# Patient Record
Sex: FEMALE | Race: OTHER | HISPANIC OR LATINO | URBAN - METROPOLITAN AREA
[De-identification: names, ages, dates, MRNs, and addresses within clinical notes are randomized per-mention and may not be internally consistent; named-entity substitution may affect disease eponyms.]

---

## 2019-11-14 ENCOUNTER — OUTPATIENT (OUTPATIENT)
Dept: OUTPATIENT SERVICES | Facility: HOSPITAL | Age: 29
LOS: 1 days | End: 2019-11-14
Payer: MEDICAID

## 2019-11-14 VITALS
HEIGHT: 64 IN | TEMPERATURE: 98 F | WEIGHT: 139.99 LBS | RESPIRATION RATE: 16 BRPM | DIASTOLIC BLOOD PRESSURE: 57 MMHG | HEART RATE: 79 BPM | OXYGEN SATURATION: 100 % | SYSTOLIC BLOOD PRESSURE: 97 MMHG

## 2019-11-14 DIAGNOSIS — L02.91 CUTANEOUS ABSCESS, UNSPECIFIED: ICD-10-CM

## 2019-11-14 DIAGNOSIS — L98.9 DISORDER OF THE SKIN AND SUBCUTANEOUS TISSUE, UNSPECIFIED: ICD-10-CM

## 2019-11-14 DIAGNOSIS — Z01.818 ENCOUNTER FOR OTHER PREPROCEDURAL EXAMINATION: ICD-10-CM

## 2019-11-14 DIAGNOSIS — Z98.891 HISTORY OF UTERINE SCAR FROM PREVIOUS SURGERY: Chronic | ICD-10-CM

## 2019-11-14 LAB
HCG SERPL-ACNC: <1 MIU/ML — SIGNIFICANT CHANGE UP
HCT VFR BLD CALC: 35.6 % — SIGNIFICANT CHANGE UP (ref 34.5–45)
HGB BLD-MCNC: 11.9 G/DL — SIGNIFICANT CHANGE UP (ref 11.5–15.5)
MCHC RBC-ENTMCNC: 26.7 PG — LOW (ref 27–34)
MCHC RBC-ENTMCNC: 33.4 GM/DL — SIGNIFICANT CHANGE UP (ref 32–36)
MCV RBC AUTO: 80 FL — SIGNIFICANT CHANGE UP (ref 80–100)
NRBC # BLD: 0 /100 WBCS — SIGNIFICANT CHANGE UP (ref 0–0)
PLATELET # BLD AUTO: 338 K/UL — SIGNIFICANT CHANGE UP (ref 150–400)
RBC # BLD: 4.45 M/UL — SIGNIFICANT CHANGE UP (ref 3.8–5.2)
RBC # FLD: 14.9 % — HIGH (ref 10.3–14.5)
WBC # BLD: 6.53 K/UL — SIGNIFICANT CHANGE UP (ref 3.8–10.5)
WBC # FLD AUTO: 6.53 K/UL — SIGNIFICANT CHANGE UP (ref 3.8–10.5)

## 2019-11-14 PROCEDURE — G0463: CPT

## 2019-11-14 PROCEDURE — 84702 CHORIONIC GONADOTROPIN TEST: CPT

## 2019-11-14 PROCEDURE — 85027 COMPLETE CBC AUTOMATED: CPT

## 2019-11-14 PROCEDURE — 36415 COLL VENOUS BLD VENIPUNCTURE: CPT

## 2019-11-14 PROCEDURE — 87389 HIV-1 AG W/HIV-1&-2 AB AG IA: CPT

## 2019-11-14 NOTE — H&P PST ADULT - HISTORY OF PRESENT ILLNESS
30 yo healthy Emirati speaking female, presents to [facility] scheduled for an excision of subcutaneous fluid collection on 2019 with Dr. Villarreal. S/P  section 2019. Reports lower abd pain, along the incision line. MRI shows "bag holding liquid", as per patient. Reports that the "hole never closed"  LMP 2019

## 2019-11-14 NOTE — H&P PST ADULT - NSICDXPROBLEM_GEN_ALL_CORE_FT
PROBLEM DIAGNOSES  Problem: Cutaneous abscess, unspecified  Assessment and Plan: scheduled for an excision of subcutaneous fluid collection on 11/20/2019 with Dr. Villarreal.    Problem: Disorder of the skin and subcutaneous tissue, unspecified  Assessment and Plan: scheduled for an excision of subcutaneous fluid collection on 11/20/2019 with Dr. Villarreal.    Problem: Pre-op evaluation  Assessment and Plan: Labs - CBC, HCG and HIV  No MC needed  Pre op and Hibiclens instructions reviewed and given. Take routine am meds DOS with sip of water. Avoid NSAIDs and OTC supplements. Verbalized understanding

## 2019-11-14 NOTE — H&P PST ADULT - ASSESSMENT
28 yo female scheduled for an excision of subcutaneous fluid collection on 11/20/2019 with Dr. Villarreal.

## 2019-11-14 NOTE — H&P PST ADULT - NSICDXPASTMEDICALHX_GEN_ALL_CORE_FT
PAST MEDICAL HISTORY:  Cutaneous abscess, unspecified     Disorder of the skin and subcutaneous tissue, unspecified

## 2019-11-15 LAB — HIV 1+2 AB+HIV1 P24 AG SERPL QL IA: SIGNIFICANT CHANGE UP

## 2019-11-19 ENCOUNTER — TRANSCRIPTION ENCOUNTER (OUTPATIENT)
Age: 29
End: 2019-11-19

## 2019-11-19 RX ORDER — SODIUM CHLORIDE 9 MG/ML
1000 INJECTION, SOLUTION INTRAVENOUS
Refills: 0 | Status: DISCONTINUED | OUTPATIENT
Start: 2019-11-20 | End: 2019-11-20

## 2019-11-19 RX ORDER — ONDANSETRON 8 MG/1
4 TABLET, FILM COATED ORAL ONCE
Refills: 0 | Status: DISCONTINUED | OUTPATIENT
Start: 2019-11-20 | End: 2019-11-20

## 2019-11-19 RX ORDER — HYDROMORPHONE HYDROCHLORIDE 2 MG/ML
0.5 INJECTION INTRAMUSCULAR; INTRAVENOUS; SUBCUTANEOUS
Refills: 0 | Status: DISCONTINUED | OUTPATIENT
Start: 2019-11-20 | End: 2019-11-20

## 2019-11-19 RX ORDER — OXYCODONE HYDROCHLORIDE 5 MG/1
5 TABLET ORAL ONCE
Refills: 0 | Status: DISCONTINUED | OUTPATIENT
Start: 2019-11-20 | End: 2019-11-20

## 2019-11-20 ENCOUNTER — RESULT REVIEW (OUTPATIENT)
Age: 29
End: 2019-11-20

## 2019-11-20 ENCOUNTER — OUTPATIENT (OUTPATIENT)
Dept: OUTPATIENT SERVICES | Facility: HOSPITAL | Age: 29
LOS: 1 days | End: 2019-11-20
Payer: MEDICAID

## 2019-11-20 VITALS
WEIGHT: 139.99 LBS | DIASTOLIC BLOOD PRESSURE: 47 MMHG | TEMPERATURE: 98 F | RESPIRATION RATE: 12 BRPM | HEIGHT: 64 IN | SYSTOLIC BLOOD PRESSURE: 101 MMHG | OXYGEN SATURATION: 95 % | HEART RATE: 63 BPM

## 2019-11-20 VITALS
OXYGEN SATURATION: 98 % | SYSTOLIC BLOOD PRESSURE: 113 MMHG | HEART RATE: 73 BPM | RESPIRATION RATE: 15 BRPM | DIASTOLIC BLOOD PRESSURE: 69 MMHG

## 2019-11-20 DIAGNOSIS — L02.91 CUTANEOUS ABSCESS, UNSPECIFIED: ICD-10-CM

## 2019-11-20 DIAGNOSIS — Z98.891 HISTORY OF UTERINE SCAR FROM PREVIOUS SURGERY: Chronic | ICD-10-CM

## 2019-11-20 DIAGNOSIS — L98.9 DISORDER OF THE SKIN AND SUBCUTANEOUS TISSUE, UNSPECIFIED: ICD-10-CM

## 2019-11-20 DIAGNOSIS — Z01.818 ENCOUNTER FOR OTHER PREPROCEDURAL EXAMINATION: ICD-10-CM

## 2019-11-20 PROCEDURE — 88300 SURGICAL PATH GROSS: CPT

## 2019-11-20 PROCEDURE — 88304 TISSUE EXAM BY PATHOLOGIST: CPT | Mod: 26

## 2019-11-20 PROCEDURE — 88300 SURGICAL PATH GROSS: CPT | Mod: 26,59

## 2019-11-20 PROCEDURE — 88304 TISSUE EXAM BY PATHOLOGIST: CPT

## 2019-11-20 PROCEDURE — 10121 INC&RMVL FB SUBQ TISS COMP: CPT

## 2019-11-20 RX ORDER — SODIUM CHLORIDE 9 MG/ML
1000 INJECTION, SOLUTION INTRAVENOUS
Refills: 0 | Status: DISCONTINUED | OUTPATIENT
Start: 2019-11-20 | End: 2019-11-20

## 2019-11-20 RX ORDER — CEFAZOLIN SODIUM 1 G
2000 VIAL (EA) INJECTION ONCE
Refills: 0 | Status: COMPLETED | OUTPATIENT
Start: 2019-11-20 | End: 2019-11-20

## 2019-11-20 RX ADMIN — SODIUM CHLORIDE 75 MILLILITER(S): 9 INJECTION, SOLUTION INTRAVENOUS at 13:30

## 2019-11-20 RX ADMIN — SODIUM CHLORIDE 75 MILLILITER(S): 9 INJECTION, SOLUTION INTRAVENOUS at 10:14

## 2019-11-20 RX ADMIN — HYDROMORPHONE HYDROCHLORIDE 0.5 MILLIGRAM(S): 2 INJECTION INTRAMUSCULAR; INTRAVENOUS; SUBCUTANEOUS at 13:38

## 2019-11-20 NOTE — ASU DISCHARGE PLAN (ADULT/PEDIATRIC) - CALL YOUR DOCTOR IF YOU HAVE ANY OF THE FOLLOWING:
Wound/Surgical Site with redness, or foul smelling discharge or pus/Nausea and vomiting that does not stop/Pain not relieved by Medications/Fever greater than (need to indicate Fahrenheit or Celsius)

## 2019-11-20 NOTE — BRIEF OPERATIVE NOTE - OPERATION/FINDINGS
Incision made over preexisting scar, approximately [id number] foreign body consistent with iodiform packing removed from subcutaneous tissue. Reactionary tissue surrounding foreign body removed using scalpel and bovie. Small fascial incision closed with running 0-vicryl suture. Subcutaneous layer reapproximated with 2-0 vicryl. Skin closed with 4-0 vicryl. Dermabond placed over incision.

## 2019-11-20 NOTE — ASU DISCHARGE PLAN (ADULT/PEDIATRIC) - CARE PROVIDER_API CALL
Melodie Poe)  Gynecology Obstetrics  Gynecology  [address] [place] [address]  Phone: [phone]  Fax: [phone]  Follow Up Time:

## 2020-11-12 PROBLEM — L02.91 CUTANEOUS ABSCESS, UNSPECIFIED: Chronic | Status: ACTIVE | Noted: 2019-11-14

## 2020-11-12 PROBLEM — L98.9 DISORDER OF THE SKIN AND SUBCUTANEOUS TISSUE, UNSPECIFIED: Chronic | Status: ACTIVE | Noted: 2019-11-14

## 2020-11-18 ENCOUNTER — APPOINTMENT (OUTPATIENT)
Dept: INFECTIOUS DISEASE | Facility: CLINIC | Age: 30
End: 2020-11-18
Payer: MEDICAID

## 2020-11-18 VITALS
BODY MASS INDEX: 20.57 KG/M2 | OXYGEN SATURATION: 96 % | WEIGHT: 128 LBS | DIASTOLIC BLOOD PRESSURE: 66 MMHG | SYSTOLIC BLOOD PRESSURE: 94 MMHG | HEIGHT: 66 IN | TEMPERATURE: 98.7 F | HEART RATE: 75 BPM

## 2020-11-18 PROCEDURE — 99203 OFFICE O/P NEW LOW 30 MIN: CPT | Mod: 25

## 2020-11-18 NOTE — HISTORY OF PRESENT ILLNESS
[Sexually Active] : The patient is sexually active [Monogamous] : monogamous [Vaginal] : vaginal [Men] : with men [Male ___] : [unfilled] male [FreeTextEntry1] : 31 y/o female patient presents for PEP management. Patient reports being stuck by a needle on her right pointer finger on 11/11 while throwing away garbage in a dumpster at her house. She washed the area right away and cleansed the area with alcohol. She was seen at [facility] urgent care on [facility] the next day. \par She had baseline blood work done, received 1st Hep B series vaccine and was prescribed Truvada (daily) and Isentress (BID). They prescribed her 30 days worth of medications. \par Patient states the medications are making her nausea, otherwise no other side effects are noted. \par  [de-identified] : Denies  [de-identified] : Unemployed  [de-identified] : Negative  [de-identified] :

## 2020-11-18 NOTE — ASSESSMENT
[FreeTextEntry1] : *PEP Management:\par -Continue medication regimen, instructed to complete only 28 days, discard the rest\par -Take with food to decrease nausea episodes, if worsens notify me\par -Safe sex practices discussed until confirmatory testing are completed, pt understood and agreed\par -Advised once medication is completed, return 1 week after for repeat testing\par -Repeat testing at 3 month period\par -Repeat testing to be done at 6 month period\par -Return to [facility] in 1 month for 2nd Hep B vaccine as directed\par -All questions answered\par \par *Follow up as directed  [Treatment Education] : treatment education [Rx Dose / Side Effects] : Rx dose/side effects [Risk Reduction] : risk reduction [HIV Education] : HIV Education [Sexuality / Safer Sex] : sexuality/safer sex [Anticipatory Guidance] : anticipatory guidance

## 2020-11-18 NOTE — PHYSICAL EXAM
[General Appearance - Alert] : alert [General Appearance - In No Acute Distress] : in no acute distress [Oropharynx] : the oropharynx was normal with no thrush [Neck Appearance] : the appearance of the neck was normal [] : no respiratory distress [Auscultation Breath Sounds / Voice Sounds] : lungs were clear to auscultation bilaterally [Heart Rate And Rhythm] : heart rate was normal and rhythm regular [Heart Sounds] : normal S1 and S2 [Heart Sounds Gallop] : no gallops [Murmurs] : no murmurs [Heart Sounds Pericardial Friction Rub] : no pericardial rub [No Palpable Adenopathy] : no palpable adenopathy [Skin Color & Pigmentation] : normal skin color and pigmentation [No Focal Deficits] : no focal deficits [Oriented To Time, Place, And Person] : oriented to person, place, and time [Affect] : the affect was normal

## 2020-12-16 ENCOUNTER — APPOINTMENT (OUTPATIENT)
Dept: INFECTIOUS DISEASE | Facility: CLINIC | Age: 30
End: 2020-12-16
Payer: MEDICAID

## 2020-12-16 VITALS
SYSTOLIC BLOOD PRESSURE: 94 MMHG | OXYGEN SATURATION: 97 % | WEIGHT: 129 LBS | DIASTOLIC BLOOD PRESSURE: 60 MMHG | RESPIRATION RATE: 16 BRPM | HEIGHT: 66 IN | BODY MASS INDEX: 20.73 KG/M2 | HEART RATE: 90 BPM | TEMPERATURE: 97.9 F
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
## 2020-12-16 DIAGNOSIS — Z29.9 ENCOUNTER FOR PROPHYLACTIC MEASURES, UNSPECIFIED: ICD-10-CM

## 2020-12-16 PROCEDURE — 99072 ADDL SUPL MATRL&STAF TM PHE: CPT

## 2020-12-16 PROCEDURE — 99213 OFFICE O/P EST LOW 20 MIN: CPT

## 2020-12-16 NOTE — HISTORY OF PRESENT ILLNESS
[Sexually Active] : The patient is sexually active [Monogamous] : monogamous [Condom Use] : using condoms [FreeTextEntry1] : 31 y/o female patient presents for PEP follow up appointment. She has completed the Truvada/Isentress regimen as directed 1 week ago and is now returning for repeat testing. She denies any side effects from the medications. \par Today she denies any complaints or concerns, she is feeling well.  \par LMP 12/3

## 2020-12-16 NOTE — ASSESSMENT
[FreeTextEntry1] : *PEP Management:\par -Labs today: HIV, Syphilis, CMP, CBC\par -continue safe sex practices until confirmatory results are available\par -Pt has appointment at [facility] for 2nd Hep B vaccine 12/18\par -All questions answered\par \par *Return at the 3 month period for repeat testing\par  [Risk Reduction] : risk reduction [HIV Education] : HIV Education [Anticipatory Guidance] : anticipatory guidance

## 2020-12-17 LAB
ALBUMIN SERPL ELPH-MCNC: 4.5 G/DL
ALP BLD-CCNC: 69 U/L
ALT SERPL-CCNC: 11 U/L
ANION GAP SERPL CALC-SCNC: 10 MMOL/L
AST SERPL-CCNC: 17 U/L
BASOPHILS # BLD AUTO: 0.08 K/UL
BASOPHILS NFR BLD AUTO: 1.2 %
BILIRUB SERPL-MCNC: 0.2 MG/DL
BUN SERPL-MCNC: 9 MG/DL
CALCIUM SERPL-MCNC: 9.4 MG/DL
CHLORIDE SERPL-SCNC: 103 MMOL/L
CO2 SERPL-SCNC: 28 MMOL/L
CREAT SERPL-MCNC: 0.99 MG/DL
EOSINOPHIL # BLD AUTO: 0.25 K/UL
EOSINOPHIL NFR BLD AUTO: 3.7 %
GLUCOSE SERPL-MCNC: 85 MG/DL
HCT VFR BLD CALC: 38.5 %
HGB BLD-MCNC: 12.2 G/DL
HIV1+2 AB SPEC QL IA.RAPID: NONREACTIVE
IMM GRANULOCYTES NFR BLD AUTO: 0.1 %
LYMPHOCYTES # BLD AUTO: 2.91 K/UL
LYMPHOCYTES NFR BLD AUTO: 43.2 %
MAN DIFF?: NORMAL
MCHC RBC-ENTMCNC: 27.7 PG
MCHC RBC-ENTMCNC: 31.7 GM/DL
MCV RBC AUTO: 87.3 FL
MONOCYTES # BLD AUTO: 0.46 K/UL
MONOCYTES NFR BLD AUTO: 6.8 %
NEUTROPHILS # BLD AUTO: 3.03 K/UL
NEUTROPHILS NFR BLD AUTO: 45 %
PLATELET # BLD AUTO: 267 K/UL
POTASSIUM SERPL-SCNC: 4 MMOL/L
PROT SERPL-MCNC: 7.4 G/DL
RBC # BLD: 4.41 M/UL
RBC # FLD: 12.6 %
SODIUM SERPL-SCNC: 141 MMOL/L
T PALLIDUM AB SER QL IA: NEGATIVE
WBC # FLD AUTO: 6.74 K/UL

## 2021-03-23 ENCOUNTER — APPOINTMENT (OUTPATIENT)
Dept: INFECTIOUS DISEASE | Facility: CLINIC | Age: 31
End: 2021-03-23

## 2021-11-05 ENCOUNTER — ASOB RESULT (OUTPATIENT)
Age: 31
End: 2021-11-05

## 2021-11-05 ENCOUNTER — APPOINTMENT (OUTPATIENT)
Dept: ANTEPARTUM | Facility: CLINIC | Age: 31
End: 2021-11-05
Payer: MEDICAID

## 2021-11-05 PROCEDURE — 76813 OB US NUCHAL MEAS 1 GEST: CPT

## 2021-11-05 PROCEDURE — 76801 OB US < 14 WKS SINGLE FETUS: CPT

## 2021-12-30 ENCOUNTER — APPOINTMENT (OUTPATIENT)
Dept: ANTEPARTUM | Facility: CLINIC | Age: 31
End: 2021-12-30
Payer: MEDICAID

## 2021-12-30 ENCOUNTER — ASOB RESULT (OUTPATIENT)
Age: 31
End: 2021-12-30

## 2021-12-30 PROCEDURE — 76817 TRANSVAGINAL US OBSTETRIC: CPT

## 2021-12-30 PROCEDURE — 76811 OB US DETAILED SNGL FETUS: CPT

## 2022-03-21 ENCOUNTER — ASOB RESULT (OUTPATIENT)
Age: 32
End: 2022-03-21

## 2022-03-21 ENCOUNTER — APPOINTMENT (OUTPATIENT)
Dept: ANTEPARTUM | Facility: CLINIC | Age: 32
End: 2022-03-21
Payer: MEDICAID

## 2022-03-21 ENCOUNTER — APPOINTMENT (OUTPATIENT)
Dept: ANTEPARTUM | Facility: CLINIC | Age: 32
End: 2022-03-21

## 2022-03-21 PROCEDURE — 76818 FETAL BIOPHYS PROFILE W/NST: CPT

## 2022-03-21 PROCEDURE — 99202 OFFICE O/P NEW SF 15 MIN: CPT | Mod: TH,25

## 2022-03-21 PROCEDURE — 76820 UMBILICAL ARTERY ECHO: CPT

## 2022-03-21 PROCEDURE — 76816 OB US FOLLOW-UP PER FETUS: CPT

## 2022-03-28 ENCOUNTER — ASOB RESULT (OUTPATIENT)
Age: 32
End: 2022-03-28

## 2022-03-28 ENCOUNTER — APPOINTMENT (OUTPATIENT)
Dept: ANTEPARTUM | Facility: CLINIC | Age: 32
End: 2022-03-28
Payer: MEDICAID

## 2022-03-28 PROCEDURE — 76818 FETAL BIOPHYS PROFILE W/NST: CPT

## 2022-04-04 ENCOUNTER — ASOB RESULT (OUTPATIENT)
Age: 32
End: 2022-04-04

## 2022-04-04 ENCOUNTER — APPOINTMENT (OUTPATIENT)
Dept: ANTEPARTUM | Facility: CLINIC | Age: 32
End: 2022-04-04

## 2022-04-04 ENCOUNTER — APPOINTMENT (OUTPATIENT)
Dept: ANTEPARTUM | Facility: CLINIC | Age: 32
End: 2022-04-04
Payer: MEDICAID

## 2022-04-04 PROCEDURE — 76820 UMBILICAL ARTERY ECHO: CPT

## 2022-04-04 PROCEDURE — 76819 FETAL BIOPHYS PROFIL W/O NST: CPT

## 2022-04-11 ENCOUNTER — ASOB RESULT (OUTPATIENT)
Age: 32
End: 2022-04-11

## 2022-04-11 ENCOUNTER — APPOINTMENT (OUTPATIENT)
Dept: ANTEPARTUM | Facility: CLINIC | Age: 32
End: 2022-04-11
Payer: MEDICAID

## 2022-04-11 PROCEDURE — 76816 OB US FOLLOW-UP PER FETUS: CPT

## 2022-04-11 PROCEDURE — 76818 FETAL BIOPHYS PROFILE W/NST: CPT

## 2022-04-11 PROCEDURE — 76820 UMBILICAL ARTERY ECHO: CPT

## 2022-04-18 ENCOUNTER — ASOB RESULT (OUTPATIENT)
Age: 32
End: 2022-04-18

## 2022-04-18 ENCOUNTER — APPOINTMENT (OUTPATIENT)
Dept: ANTEPARTUM | Facility: CLINIC | Age: 32
End: 2022-04-18
Payer: MEDICAID

## 2022-04-18 PROCEDURE — 76820 UMBILICAL ARTERY ECHO: CPT

## 2022-04-18 PROCEDURE — 76818 FETAL BIOPHYS PROFILE W/NST: CPT

## 2022-04-25 ENCOUNTER — OUTPATIENT (OUTPATIENT)
Dept: OUTPATIENT SERVICES | Facility: HOSPITAL | Age: 32
LOS: 1 days | End: 2022-04-25

## 2022-04-25 ENCOUNTER — APPOINTMENT (OUTPATIENT)
Dept: ANTEPARTUM | Facility: CLINIC | Age: 32
End: 2022-04-25
Payer: MEDICAID

## 2022-04-25 ENCOUNTER — APPOINTMENT (OUTPATIENT)
Dept: ANTEPARTUM | Facility: CLINIC | Age: 32
End: 2022-04-25

## 2022-04-25 ENCOUNTER — ASOB RESULT (OUTPATIENT)
Age: 32
End: 2022-04-25

## 2022-04-25 VITALS
HEIGHT: 64 IN | DIASTOLIC BLOOD PRESSURE: 68 MMHG | RESPIRATION RATE: 16 BRPM | HEART RATE: 92 BPM | TEMPERATURE: 98 F | WEIGHT: 149.91 LBS | OXYGEN SATURATION: 99 % | SYSTOLIC BLOOD PRESSURE: 101 MMHG

## 2022-04-25 DIAGNOSIS — Z98.891 HISTORY OF UTERINE SCAR FROM PREVIOUS SURGERY: Chronic | ICD-10-CM

## 2022-04-25 DIAGNOSIS — Z98.891 HISTORY OF UTERINE SCAR FROM PREVIOUS SURGERY: ICD-10-CM

## 2022-04-25 LAB
APPEARANCE UR: ABNORMAL
BILIRUB UR-MCNC: NEGATIVE — SIGNIFICANT CHANGE UP
BLD GP AB SCN SERPL QL: NEGATIVE — SIGNIFICANT CHANGE UP
COLOR SPEC: SIGNIFICANT CHANGE UP
DIFF PNL FLD: NEGATIVE — SIGNIFICANT CHANGE UP
GLUCOSE UR QL: NEGATIVE — SIGNIFICANT CHANGE UP
HCT VFR BLD CALC: 33.2 % — LOW (ref 34.5–45)
HGB BLD-MCNC: 11 G/DL — LOW (ref 11.5–15.5)
KETONES UR-MCNC: NEGATIVE — SIGNIFICANT CHANGE UP
LEUKOCYTE ESTERASE UR-ACNC: NEGATIVE — SIGNIFICANT CHANGE UP
MCHC RBC-ENTMCNC: 28.2 PG — SIGNIFICANT CHANGE UP (ref 27–34)
MCHC RBC-ENTMCNC: 33.1 GM/DL — SIGNIFICANT CHANGE UP (ref 32–36)
MCV RBC AUTO: 85.1 FL — SIGNIFICANT CHANGE UP (ref 80–100)
NITRITE UR-MCNC: NEGATIVE — SIGNIFICANT CHANGE UP
NRBC # BLD: 0 /100 WBCS — SIGNIFICANT CHANGE UP
NRBC # FLD: 0 K/UL — SIGNIFICANT CHANGE UP
PH UR: 6.5 — SIGNIFICANT CHANGE UP (ref 5–8)
PLATELET # BLD AUTO: 239 K/UL — SIGNIFICANT CHANGE UP (ref 150–400)
PROT UR-MCNC: NEGATIVE — SIGNIFICANT CHANGE UP
RBC # BLD: 3.9 M/UL — SIGNIFICANT CHANGE UP (ref 3.8–5.2)
RBC # FLD: 14.3 % — SIGNIFICANT CHANGE UP (ref 10.3–14.5)
RH IG SCN BLD-IMP: POSITIVE — SIGNIFICANT CHANGE UP
SP GR SPEC: 1.01 — SIGNIFICANT CHANGE UP (ref 1–1.05)
UROBILINOGEN FLD QL: SIGNIFICANT CHANGE UP
WBC # BLD: 8.94 K/UL — SIGNIFICANT CHANGE UP (ref 3.8–10.5)
WBC # FLD AUTO: 8.94 K/UL — SIGNIFICANT CHANGE UP (ref 3.8–10.5)

## 2022-04-25 PROCEDURE — 76820 UMBILICAL ARTERY ECHO: CPT

## 2022-04-25 PROCEDURE — 76818 FETAL BIOPHYS PROFILE W/NST: CPT

## 2022-04-25 RX ORDER — NORETHINDRONE AND ETHINYL ESTRADIOL 0.4-0.035
1 KIT ORAL
Qty: 0 | Refills: 0 | DISCHARGE

## 2022-04-25 RX ORDER — ERGOCALCIFEROL 1.25 MG/1
0 CAPSULE ORAL
Qty: 0 | Refills: 0 | DISCHARGE

## 2022-04-25 NOTE — OB PST NOTE - PROBLEM SELECTOR PLAN 1
Repeat  section.        CBC UA T&S     Preop instructions and antibacterial soap given and explained (verbal and written), with teach back.

## 2022-04-25 NOTE — OB PST NOTE - NSICDXPASTMEDICALHX_GEN_ALL_CORE_FT
PAST MEDICAL HISTORY:  Cutaneous abscess, unspecified     Disorder of the skin and subcutaneous tissue, unspecified     History of anemia     Sickle cell trait

## 2022-04-25 NOTE — OB PST NOTE - SOURCE OF INFORMATION, OB PROFILE
patient language line Mongolian   assisted with communication at PST.  [place], REf #[id number]/patient

## 2022-04-25 NOTE — OB PST NOTE - ASSESSMENT
32 y/o female  with hx of  section due to "fever in labor".  Now with EDC 5/15/22, scheduled  for repeat  section.  Pt report she feels baby moving as usual today

## 2022-04-25 NOTE — OB PST NOTE - FALL HARM RISK - UNIVERSAL INTERVENTIONS
Bed in lowest position, wheels locked, appropriate side rails in place/Call bell, personal items and telephone in reach/Instruct patient to call for assistance before getting out of bed or chair/Non-slip footwear when patient is out of bed/[place] to call system/Physically safe environment - no spills, clutter or unnecessary equipment/Purposeful Proactive Rounding/Room/bathroom lighting operational, light cord in reach

## 2022-04-25 NOTE — OB PST NOTE - HISTORY OF PRESENT ILLNESS
32y/o female  with hx of  section due to "fever in labor".  Now with EDC 5/15/22, scheduled  for repeat  section.  Pt report she feels baby moving as usual today 30 y/o female  with hx of  section due to "fever in labor".  Now with EDC 5/15/22, scheduled  for repeat  section.  Pt report she feels baby moving as usual today

## 2022-05-02 ENCOUNTER — APPOINTMENT (OUTPATIENT)
Dept: ANTEPARTUM | Facility: CLINIC | Age: 32
End: 2022-05-02
Payer: MEDICAID

## 2022-05-02 ENCOUNTER — ASOB RESULT (OUTPATIENT)
Age: 32
End: 2022-05-02

## 2022-05-02 PROCEDURE — 76816 OB US FOLLOW-UP PER FETUS: CPT

## 2022-05-02 PROCEDURE — 76819 FETAL BIOPHYS PROFIL W/O NST: CPT

## 2022-05-08 ENCOUNTER — TRANSCRIPTION ENCOUNTER (OUTPATIENT)
Age: 32
End: 2022-05-08

## 2022-05-09 ENCOUNTER — TRANSCRIPTION ENCOUNTER (OUTPATIENT)
Age: 32
End: 2022-05-09

## 2022-05-09 ENCOUNTER — RESULT REVIEW (OUTPATIENT)
Age: 32
End: 2022-05-09

## 2022-05-09 ENCOUNTER — INPATIENT (INPATIENT)
Facility: HOSPITAL | Age: 32
LOS: 2 days | Discharge: HOME | End: 2022-05-12
Attending: OBSTETRICS & GYNECOLOGY | Admitting: OBSTETRICS & GYNECOLOGY
Payer: MEDICAID

## 2022-05-09 VITALS
HEIGHT: 66 IN | HEART RATE: 74 BPM | DIASTOLIC BLOOD PRESSURE: 56 MMHG | RESPIRATION RATE: 16 BRPM | WEIGHT: 149.91 LBS | TEMPERATURE: 98 F | SYSTOLIC BLOOD PRESSURE: 96 MMHG

## 2022-05-09 DIAGNOSIS — Z98.891 HISTORY OF UTERINE SCAR FROM PREVIOUS SURGERY: ICD-10-CM

## 2022-05-09 DIAGNOSIS — Z98.891 HISTORY OF UTERINE SCAR FROM PREVIOUS SURGERY: Chronic | ICD-10-CM

## 2022-05-09 LAB
BASOPHILS # BLD AUTO: 0.02 K/UL — SIGNIFICANT CHANGE UP (ref 0–0.2)
BASOPHILS # BLD AUTO: 0.03 K/UL — SIGNIFICANT CHANGE UP (ref 0–0.2)
BASOPHILS NFR BLD AUTO: 0.2 % — SIGNIFICANT CHANGE UP (ref 0–2)
BASOPHILS NFR BLD AUTO: 0.2 % — SIGNIFICANT CHANGE UP (ref 0–2)
COVID-19 SPIKE DOMAIN AB INTERP: POSITIVE
COVID-19 SPIKE DOMAIN ANTIBODY RESULT: >250 U/ML — HIGH
EOSINOPHIL # BLD AUTO: 0.01 K/UL — SIGNIFICANT CHANGE UP (ref 0–0.5)
EOSINOPHIL # BLD AUTO: 0.07 K/UL — SIGNIFICANT CHANGE UP (ref 0–0.5)
EOSINOPHIL NFR BLD AUTO: 0.1 % — SIGNIFICANT CHANGE UP (ref 0–6)
EOSINOPHIL NFR BLD AUTO: 0.8 % — SIGNIFICANT CHANGE UP (ref 0–6)
HCT VFR BLD CALC: 35.2 % — SIGNIFICANT CHANGE UP (ref 34.5–45)
HCT VFR BLD CALC: 35.9 % — SIGNIFICANT CHANGE UP (ref 34.5–45)
HGB BLD-MCNC: 11.8 G/DL — SIGNIFICANT CHANGE UP (ref 11.5–15.5)
HGB BLD-MCNC: 11.9 G/DL — SIGNIFICANT CHANGE UP (ref 11.5–15.5)
IANC: 13.54 K/UL — HIGH (ref 1.8–7.4)
IANC: 5.92 K/UL — SIGNIFICANT CHANGE UP (ref 1.8–7.4)
IMM GRANULOCYTES NFR BLD AUTO: 0.6 % — SIGNIFICANT CHANGE UP (ref 0–1.5)
IMM GRANULOCYTES NFR BLD AUTO: 1.5 % — SIGNIFICANT CHANGE UP (ref 0–1.5)
LYMPHOCYTES # BLD AUTO: 1.65 K/UL — SIGNIFICANT CHANGE UP (ref 1–3.3)
LYMPHOCYTES # BLD AUTO: 10.4 % — LOW (ref 13–44)
LYMPHOCYTES # BLD AUTO: 2.28 K/UL — SIGNIFICANT CHANGE UP (ref 1–3.3)
LYMPHOCYTES # BLD AUTO: 24.7 % — SIGNIFICANT CHANGE UP (ref 13–44)
MCHC RBC-ENTMCNC: 28.4 PG — SIGNIFICANT CHANGE UP (ref 27–34)
MCHC RBC-ENTMCNC: 28.4 PG — SIGNIFICANT CHANGE UP (ref 27–34)
MCHC RBC-ENTMCNC: 33.1 GM/DL — SIGNIFICANT CHANGE UP (ref 32–36)
MCHC RBC-ENTMCNC: 33.5 GM/DL — SIGNIFICANT CHANGE UP (ref 32–36)
MCV RBC AUTO: 84.8 FL — SIGNIFICANT CHANGE UP (ref 80–100)
MCV RBC AUTO: 85.7 FL — SIGNIFICANT CHANGE UP (ref 80–100)
MONOCYTES # BLD AUTO: 0.58 K/UL — SIGNIFICANT CHANGE UP (ref 0–0.9)
MONOCYTES # BLD AUTO: 0.8 K/UL — SIGNIFICANT CHANGE UP (ref 0–0.9)
MONOCYTES NFR BLD AUTO: 3.6 % — SIGNIFICANT CHANGE UP (ref 2–14)
MONOCYTES NFR BLD AUTO: 8.7 % — SIGNIFICANT CHANGE UP (ref 2–14)
NEUTROPHILS # BLD AUTO: 13.54 K/UL — HIGH (ref 1.8–7.4)
NEUTROPHILS # BLD AUTO: 5.92 K/UL — SIGNIFICANT CHANGE UP (ref 1.8–7.4)
NEUTROPHILS NFR BLD AUTO: 64.1 % — SIGNIFICANT CHANGE UP (ref 43–77)
NEUTROPHILS NFR BLD AUTO: 85.1 % — HIGH (ref 43–77)
NRBC # BLD: 0 /100 WBCS — SIGNIFICANT CHANGE UP
NRBC # BLD: 0 /100 WBCS — SIGNIFICANT CHANGE UP
NRBC # FLD: 0 K/UL — SIGNIFICANT CHANGE UP
NRBC # FLD: 0 K/UL — SIGNIFICANT CHANGE UP
PLATELET # BLD AUTO: 181 K/UL — SIGNIFICANT CHANGE UP (ref 150–400)
PLATELET # BLD AUTO: 196 K/UL — SIGNIFICANT CHANGE UP (ref 150–400)
RBC # BLD: 4.15 M/UL — SIGNIFICANT CHANGE UP (ref 3.8–5.2)
RBC # BLD: 4.19 M/UL — SIGNIFICANT CHANGE UP (ref 3.8–5.2)
RBC # FLD: 13.7 % — SIGNIFICANT CHANGE UP (ref 10.3–14.5)
RBC # FLD: 13.9 % — SIGNIFICANT CHANGE UP (ref 10.3–14.5)
SARS-COV-2 IGG+IGM SERPL QL IA: >250 U/ML — HIGH
SARS-COV-2 IGG+IGM SERPL QL IA: POSITIVE
T PALLIDUM AB TITR SER: NEGATIVE — SIGNIFICANT CHANGE UP
WBC # BLD: 15.91 K/UL — HIGH (ref 3.8–10.5)
WBC # BLD: 9.23 K/UL — SIGNIFICANT CHANGE UP (ref 3.8–10.5)
WBC # FLD AUTO: 15.91 K/UL — HIGH (ref 3.8–10.5)
WBC # FLD AUTO: 9.23 K/UL — SIGNIFICANT CHANGE UP (ref 3.8–10.5)

## 2022-05-09 PROCEDURE — 88302 TISSUE EXAM BY PATHOLOGIST: CPT | Mod: 26

## 2022-05-09 DEVICE — SURGICEL FIBRILLAR 1 X 2": Type: IMPLANTABLE DEVICE | Status: FUNCTIONAL

## 2022-05-09 RX ORDER — HEPARIN SODIUM 5000 [USP'U]/ML
5000 INJECTION INTRAVENOUS; SUBCUTANEOUS EVERY 12 HOURS
Refills: 0 | Status: DISCONTINUED | OUTPATIENT
Start: 2022-05-09 | End: 2022-05-09

## 2022-05-09 RX ORDER — OXYCODONE HYDROCHLORIDE 5 MG/1
5 TABLET ORAL ONCE
Refills: 0 | Status: DISCONTINUED | OUTPATIENT
Start: 2022-05-09 | End: 2022-05-09

## 2022-05-09 RX ORDER — OXYCODONE HYDROCHLORIDE 5 MG/1
5 TABLET ORAL
Refills: 0 | Status: COMPLETED | OUTPATIENT
Start: 2022-05-09 | End: 2022-05-16

## 2022-05-09 RX ORDER — IBUPROFEN 200 MG
1 TABLET ORAL
Qty: 0 | Refills: 0 | DISCHARGE
Start: 2022-05-09

## 2022-05-09 RX ORDER — FAMOTIDINE 10 MG/ML
20 INJECTION INTRAVENOUS ONCE
Refills: 0 | Status: COMPLETED | OUTPATIENT
Start: 2022-05-09 | End: 2022-05-09

## 2022-05-09 RX ORDER — MAGNESIUM HYDROXIDE 400 MG/1
30 TABLET, CHEWABLE ORAL
Refills: 0 | Status: DISCONTINUED | OUTPATIENT
Start: 2022-05-09 | End: 2022-05-12

## 2022-05-09 RX ORDER — MAGNESIUM HYDROXIDE 400 MG/1
30 TABLET, CHEWABLE ORAL
Refills: 0 | Status: DISCONTINUED | OUTPATIENT
Start: 2022-05-09 | End: 2022-05-09

## 2022-05-09 RX ORDER — OXYTOCIN 10 UNIT/ML
333.33 VIAL (ML) INJECTION
Qty: 20 | Refills: 0 | Status: DISCONTINUED | OUTPATIENT
Start: 2022-05-09 | End: 2022-05-09

## 2022-05-09 RX ORDER — DIPHENHYDRAMINE HCL 50 MG
25 CAPSULE ORAL EVERY 6 HOURS
Refills: 0 | Status: DISCONTINUED | OUTPATIENT
Start: 2022-05-09 | End: 2022-05-12

## 2022-05-09 RX ORDER — OXYCODONE HYDROCHLORIDE 5 MG/1
5 TABLET ORAL
Refills: 0 | Status: DISCONTINUED | OUTPATIENT
Start: 2022-05-09 | End: 2022-05-09

## 2022-05-09 RX ORDER — SODIUM CHLORIDE 9 MG/ML
1000 INJECTION, SOLUTION INTRAVENOUS
Refills: 0 | Status: DISCONTINUED | OUTPATIENT
Start: 2022-05-09 | End: 2022-05-09

## 2022-05-09 RX ORDER — ACETAMINOPHEN 500 MG
3 TABLET ORAL
Qty: 0 | Refills: 0 | DISCHARGE
Start: 2022-05-09

## 2022-05-09 RX ORDER — ONDANSETRON 8 MG/1
4 TABLET, FILM COATED ORAL EVERY 6 HOURS
Refills: 0 | Status: DISCONTINUED | OUTPATIENT
Start: 2022-05-09 | End: 2022-05-12

## 2022-05-09 RX ORDER — TETANUS TOXOID, REDUCED DIPHTHERIA TOXOID AND ACELLULAR PERTUSSIS VACCINE, ADSORBED 5; 2.5; 8; 8; 2.5 [IU]/.5ML; [IU]/.5ML; UG/.5ML; UG/.5ML; UG/.5ML
0.5 SUSPENSION INTRAMUSCULAR ONCE
Refills: 0 | Status: DISCONTINUED | OUTPATIENT
Start: 2022-05-09 | End: 2022-05-09

## 2022-05-09 RX ORDER — ACETAMINOPHEN 500 MG
1000 TABLET ORAL ONCE
Refills: 0 | Status: DISCONTINUED | OUTPATIENT
Start: 2022-05-09 | End: 2022-05-12

## 2022-05-09 RX ORDER — NALOXONE HYDROCHLORIDE 4 MG/.1ML
0.1 SPRAY NASAL
Refills: 0 | Status: DISCONTINUED | OUTPATIENT
Start: 2022-05-09 | End: 2022-05-12

## 2022-05-09 RX ORDER — SIMETHICONE 80 MG/1
80 TABLET, CHEWABLE ORAL EVERY 4 HOURS
Refills: 0 | Status: DISCONTINUED | OUTPATIENT
Start: 2022-05-09 | End: 2022-05-09

## 2022-05-09 RX ORDER — SODIUM CHLORIDE 9 MG/ML
1000 INJECTION, SOLUTION INTRAVENOUS ONCE
Refills: 0 | Status: COMPLETED | OUTPATIENT
Start: 2022-05-09 | End: 2022-05-09

## 2022-05-09 RX ORDER — DIPHENHYDRAMINE HCL 50 MG
25 CAPSULE ORAL EVERY 6 HOURS
Refills: 0 | Status: DISCONTINUED | OUTPATIENT
Start: 2022-05-09 | End: 2022-05-09

## 2022-05-09 RX ORDER — LANOLIN
1 OINTMENT (GRAM) TOPICAL EVERY 6 HOURS
Refills: 0 | Status: DISCONTINUED | OUTPATIENT
Start: 2022-05-09 | End: 2022-05-12

## 2022-05-09 RX ORDER — KETOROLAC TROMETHAMINE 30 MG/ML
30 SYRINGE (ML) INJECTION EVERY 6 HOURS
Refills: 0 | Status: DISCONTINUED | OUTPATIENT
Start: 2022-05-09 | End: 2022-05-10

## 2022-05-09 RX ORDER — OXYTOCIN 10 UNIT/ML
333.33 VIAL (ML) INJECTION
Qty: 20 | Refills: 0 | Status: DISCONTINUED | OUTPATIENT
Start: 2022-05-09 | End: 2022-05-10

## 2022-05-09 RX ORDER — DEXAMETHASONE 0.5 MG/5ML
4 ELIXIR ORAL EVERY 6 HOURS
Refills: 0 | Status: DISCONTINUED | OUTPATIENT
Start: 2022-05-09 | End: 2022-05-12

## 2022-05-09 RX ORDER — LANOLIN
1 OINTMENT (GRAM) TOPICAL EVERY 6 HOURS
Refills: 0 | Status: DISCONTINUED | OUTPATIENT
Start: 2022-05-09 | End: 2022-05-09

## 2022-05-09 RX ORDER — IBUPROFEN 200 MG
600 TABLET ORAL EVERY 6 HOURS
Refills: 0 | Status: DISCONTINUED | OUTPATIENT
Start: 2022-05-09 | End: 2022-05-09

## 2022-05-09 RX ORDER — TETANUS TOXOID, REDUCED DIPHTHERIA TOXOID AND ACELLULAR PERTUSSIS VACCINE, ADSORBED 5; 2.5; 8; 8; 2.5 [IU]/.5ML; [IU]/.5ML; UG/.5ML; UG/.5ML; UG/.5ML
0.5 SUSPENSION INTRAMUSCULAR ONCE
Refills: 0 | Status: COMPLETED | OUTPATIENT
Start: 2022-05-09 | End: 2022-05-10

## 2022-05-09 RX ORDER — FOLIC ACID 0.8 MG
1 TABLET ORAL DAILY
Refills: 0 | Status: DISCONTINUED | OUTPATIENT
Start: 2022-05-09 | End: 2022-05-12

## 2022-05-09 RX ORDER — ACETAMINOPHEN 500 MG
975 TABLET ORAL
Refills: 0 | Status: DISCONTINUED | OUTPATIENT
Start: 2022-05-09 | End: 2022-05-09

## 2022-05-09 RX ORDER — ACETAMINOPHEN 500 MG
975 TABLET ORAL
Refills: 0 | Status: DISCONTINUED | OUTPATIENT
Start: 2022-05-09 | End: 2022-05-12

## 2022-05-09 RX ORDER — FERROUS SULFATE 325(65) MG
325 TABLET ORAL DAILY
Refills: 0 | Status: DISCONTINUED | OUTPATIENT
Start: 2022-05-09 | End: 2022-05-12

## 2022-05-09 RX ORDER — KETOROLAC TROMETHAMINE 30 MG/ML
30 SYRINGE (ML) INJECTION EVERY 6 HOURS
Refills: 0 | Status: DISCONTINUED | OUTPATIENT
Start: 2022-05-09 | End: 2022-05-09

## 2022-05-09 RX ORDER — ASPIRIN/CALCIUM CARB/MAGNESIUM 324 MG
0 TABLET ORAL
Qty: 0 | Refills: 0 | DISCHARGE

## 2022-05-09 RX ORDER — OXYCODONE HYDROCHLORIDE 5 MG/1
5 TABLET ORAL ONCE
Refills: 0 | Status: DISCONTINUED | OUTPATIENT
Start: 2022-05-09 | End: 2022-05-12

## 2022-05-09 RX ORDER — SIMETHICONE 80 MG/1
80 TABLET, CHEWABLE ORAL EVERY 4 HOURS
Refills: 0 | Status: DISCONTINUED | OUTPATIENT
Start: 2022-05-09 | End: 2022-05-12

## 2022-05-09 RX ORDER — IBUPROFEN 200 MG
600 TABLET ORAL EVERY 6 HOURS
Refills: 0 | Status: COMPLETED | OUTPATIENT
Start: 2022-05-09 | End: 2023-04-07

## 2022-05-09 RX ORDER — CITRIC ACID/SODIUM CITRATE 300-500 MG
30 SOLUTION, ORAL ORAL ONCE
Refills: 0 | Status: COMPLETED | OUTPATIENT
Start: 2022-05-09 | End: 2022-05-09

## 2022-05-09 RX ORDER — TETANUS TOXOID, REDUCED DIPHTHERIA TOXOID AND ACELLULAR PERTUSSIS VACCINE, ADSORBED 5; 2.5; 8; 8; 2.5 [IU]/.5ML; [IU]/.5ML; UG/.5ML; UG/.5ML; UG/.5ML
0.5 SUSPENSION INTRAMUSCULAR ONCE
Refills: 0 | Status: COMPLETED | OUTPATIENT
Start: 2022-05-09

## 2022-05-09 RX ORDER — HEPARIN SODIUM 5000 [USP'U]/ML
5000 INJECTION INTRAVENOUS; SUBCUTANEOUS EVERY 12 HOURS
Refills: 0 | Status: DISCONTINUED | OUTPATIENT
Start: 2022-05-09 | End: 2022-05-12

## 2022-05-09 RX ADMIN — HEPARIN SODIUM 5000 UNIT(S): 5000 INJECTION INTRAVENOUS; SUBCUTANEOUS at 18:32

## 2022-05-09 RX ADMIN — SODIUM CHLORIDE 2000 MILLILITER(S): 9 INJECTION, SOLUTION INTRAVENOUS at 08:00

## 2022-05-09 RX ADMIN — FAMOTIDINE 20 MILLIGRAM(S): 10 INJECTION INTRAVENOUS at 09:38

## 2022-05-09 RX ADMIN — Medication 325 MILLIGRAM(S): at 18:30

## 2022-05-09 RX ADMIN — Medication 1 TABLET(S): at 18:29

## 2022-05-09 RX ADMIN — Medication 975 MILLIGRAM(S): at 22:11

## 2022-05-09 RX ADMIN — Medication 30 MILLILITER(S): at 09:39

## 2022-05-09 RX ADMIN — Medication 30 MILLIGRAM(S): at 18:50

## 2022-05-09 RX ADMIN — Medication 975 MILLIGRAM(S): at 21:11

## 2022-05-09 RX ADMIN — Medication 30 MILLIGRAM(S): at 18:35

## 2022-05-09 RX ADMIN — Medication 1 MILLIGRAM(S): at 18:30

## 2022-05-09 RX ADMIN — Medication 30 MILLIGRAM(S): at 23:57

## 2022-05-09 NOTE — DISCHARGE NOTE OB - NS MD DC FALL RISK RISK
For information on Fall & Injury Prevention, visit: [email or web address] OR  [email or web address] OR  https://www.cdc.gov/steadi/patient.html

## 2022-05-09 NOTE — OB RN INTRAOPERATIVE NOTE - NSSELHIDDEN_OBGYN_ALL_OB_FT
[NS_DeliveryAttending2_OBGYN_ALL_OB_FT:[id number]],[NS_DeliveryAssist1_OBGYN_ALL_OB_FT:[id number]],[NS_DeliveryRN_OBGYN_ALL_OB_FT:[id number]] [NS_DeliveryAttending2_OBGYN_ALL_OB_FT:[id number]=],[NS_DeliveryAssist1_OBGYN_ALL_OB_FT:[id number]],[NS_DeliveryRN_OBGYN_ALL_OB_FT:[id number]],[NS_DeliveryAttending1_OBGYN_ALL_OB_FT:[id number]]

## 2022-05-09 NOTE — DISCHARGE NOTE OB - CARE PROVIDER_API CALL
Ruthann Garnett)  Obstetrics and Gynecology  [address] [place] [address]  Phone: [phone]  Fax: [phone]  Follow Up Time:

## 2022-05-09 NOTE — DISCHARGE NOTE OB - HOSPITAL COURSE
repeat LTCS + BS uncomplicated  viable male infant, vertex presentation, Apgars 8/9, cord gasses sent  grossly normal fallopian tubes and ovaries. Uterus with left sided anterior adhesion not taken down.   uterus closed in 2 layer with Vicryl. Fibrillar placed over hysterotomy with good hemostasis. Bladder backfilled with methylene blue without any extravasation. Fascia closed with Vicryl. Subcutaneous tissue reapproximated with running plain gut. Skin closed in subcuticular fashion.     EBL: 655  IVF: 2500  UOP: 800

## 2022-05-09 NOTE — DISCHARGE NOTE OB - PATIENT PORTAL LINK FT
You can access the FollowMyHealth Patient Portal offered by [facility] by registering at the following website: [email or web address]. By joining [facility]’s FollowMyHealth portal, you will also be able to view your health information using other applications (apps) compatible with our system.

## 2022-05-09 NOTE — DISCHARGE NOTE OB - MEDICATION SUMMARY - MEDICATIONS TO STOP TAKING
I will STOP taking the medications listed below when I get home from the hospital:    aspirin 81 mg oral tablet  -- 1 tab oral daily

## 2022-05-09 NOTE — OB PROVIDER DELIVERY SUMMARY - AS DELIV COMPLICATIONS OB
Take prescribed medications as labeled as needed for pain.  Do not drive, drink alcohol, operate machinery while taking Robaxin.  Do not take other NSAIDs with Toradol such as ibuprofen, Motrin, Advil, Aleve, naproxen, or aspirin.  Follow-up with PCP in 2-3 days return to ED for concerns or worsening symptoms.   nuchal cord

## 2022-05-09 NOTE — OB PROVIDER H&P - HISTORY OF PRESENT ILLNESS
32 yo , EGA@39.1 weeks presented for scheduled repeat  section and BTL. Patient denies contractions leaking fluid/ vaginal bleeding, reported + fetal movements.

## 2022-05-09 NOTE — OB PROVIDER H&P - ASSESSMENT
30 yo , EGA@39.1 weeks, presented for scheduled repeat  section with BTL.  Patient was interviewed using  Yuliya, #[id number].  Patient denies contractions, reports  + fetal movements,  denies leaking of fluid, denies vaginal bleeding. Pt denies any other concerns.  Patient denies symptoms of COVID 19, denies recent illness, fully vaccinated.  Prenatal care with Dr. Starkey.  Prenatal course is uncomplicated.    GBS negative status.    OB hx: 2019,  section, chorioamnionitis, 8hss8in, [facility]; prenatal course was complicated by gestational HTN, postpartum course was complicated by retained foreign body, patient was admitted     on 2019 for foreign body removal.  Med hx: sickle cell trait, FOB is negative denies hx clotting or bleeding disorders HTN, DM  Surg hx: 2019,  section, 2019, foreign body removal   GYN hx: denies hx abnormal Papsmear, STIs, fibroids, polyps, cysts  Family hx: no hx congential disorders, bleeding/clotting disorders  Psych hx: denies   Social hx: denies ETOH, smoking, drugs. Safe at home with child and SO.   Meds: PNV qd; ASA 81 qd; FeSO4 BID  No Known Allergies    – Will accept blood transfusions? Yes    T(C): 37.2  HR: 74 bpm  BP: 96/56  RR: 16    – PE:   CV: RRR  Pulm: breathing comfortably on RA  Abd: gravid, nontender  Extr: moving all extremities with ease    – FHT: baseline 130, mod variability, +accels, -decels  – [place]: occasional mild   – EFW: 3000 g  – [place]: vertex    A: 30 yo, , EGA@39.1 weeks, scheduled for repeat  section with BTL.  P: admitted for repeat  section.    Patient discussed with attending physician, Dr. Lugo.    Misti Bryant CNM

## 2022-05-09 NOTE — DISCHARGE NOTE OB - MEDICATION SUMMARY - MEDICATIONS TO TAKE
I will START or STAY ON the medications listed below when I get home from the hospital:    iron  -- OTC iron supplement. 1 tab oral daily  -- Indication: For anemia    vitamin d  -- 1 tab oral daily  -- Indication: For nutrition    Tylenol 325 mg oral tablet  -- 3 tab(s) by mouth every 6 hours, As Needed  -- Indication: For pain    ibuprofen 600 mg oral tablet  -- 1 tab(s) by mouth every 6 hours, As Needed  -- Indication: For pain    Prenatal 1 oral capsule  -- orally once a day. last dose 5/1/22  -- Indication: For nutrition    ferrous sulfate 325 mg (65 mg elemental iron) oral tablet  -- 1 tab(s) by mouth 3 times a day  -- Indication: For anemia    ascorbic acid 500 mg oral tablet  -- 1 tab(s) by mouth once a day  -- Indication: For nutrition

## 2022-05-09 NOTE — OB RN PATIENT PROFILE - NS_OBGYNHISTORY_OBGYN_ALL_OB_FT
2019,  section, chorioamnionitis, 7fwe4ld, [facility]; prenatal course was complicated by gestational HTN, postpartum course was complicated by retained foreign body, patient was admitted on 2019 for foreign body removal.

## 2022-05-09 NOTE — DISCHARGE NOTE OB - ADDITIONAL INSTRUCTIONS
follow up @ [facility] in 2 weeks for incision check  [phone]

## 2022-05-09 NOTE — OB PROVIDER DELIVERY SUMMARY - NSSELHIDDEN_OBGYN_ALL_OB_FT
[NS_DeliveryAttending2_OBGYN_ALL_OB_FT:[id number]],[NS_DeliveryAssist1_OBGYN_ALL_OB_FT:[id number]],[NS_DeliveryRN_OBGYN_ALL_OB_FT:[id number]],[NS_DeliveryAttending1_OBGYN_ALL_OB_FT:[id number]]

## 2022-05-09 NOTE — OB RN DELIVERY SUMMARY - NSSELHIDDEN_OBGYN_ALL_OB_FT
[NS_DeliveryAttending2_OBGYN_ALL_OB_FT:[id number]],[NS_DeliveryAssist1_OBGYN_ALL_OB_FT:[id number]],[NS_DeliveryRN_OBGYN_ALL_OB_FT:[id number]],[NS_DeliveryAttending1_OBGYN_ALL_OB_FT:[id number]=]

## 2022-05-09 NOTE — OB PROVIDER H&P - NS_OBGYNHISTORY_OBGYN_ALL_OB_FT
2019,  section, chorioamnionitis, 8bno0cm, [facility]; prenatal course was complicated by gestational HTN, postpartum course was complicated by retained foreign body, patient was admitted on 2019 for foreign body removal.

## 2022-05-09 NOTE — DISCHARGE NOTE OB - CARE PLAN
Assessment and plan of treatment:	as above   1 Principal Discharge DX:	Delivery of pregnancy by  section  Assessment and plan of treatment:	as above

## 2022-05-09 NOTE — OB PROVIDER DELIVERY SUMMARY - NSPROVIDERDELIVERYNOTE_OBGYN_ALL_OB_FT
repeat LTCS + BS uncomplicated  viable male infant, vertex presentation, Apgars 8/9, cord gasses sent  grossly normal fallopian tubes and ovaries. Uterus with left sided anterior adhesion not taken down.   uterus closed in 2 layer with Vicryl. Fibrillar placed over hysterotomy with good hemostasis. Bladder backfilled with methylene blue without any extravasation. Fascia closed with Vicryl. Subcutaneous tissue reapproximated with running plain gut. Skin closed in subcuticular fashion.     EBL: 655  IVF: 2500  UOP: 800    Milagros Grigsby PGY-2  w/ Dr. Garnett repeat LTCS + BS uncomplicated  viable male infant, vertex presentation, Apgars 8/9,  3090g, cord gasses sent  grossly normal fallopian tubes and ovaries. Uterus with left sided anterior adhesion not taken down.   uterus closed in 2 layer with Vicryl. Fibrillar placed over hysterotomy with good hemostasis. Bladder backfilled with methylene blue without any extravasation. Fascia closed with Vicryl. Subcutaneous tissue reapproximated with running plain gut. Skin closed in subcuticular fashion.     EBL: 655  IVF: 2500  UOP: 800    Milagros Grigsby PGY-2  w/ Dr. Granett

## 2022-05-10 LAB
BASOPHILS # BLD AUTO: 0.04 K/UL — SIGNIFICANT CHANGE UP (ref 0–0.2)
BASOPHILS NFR BLD AUTO: 0.3 % — SIGNIFICANT CHANGE UP (ref 0–2)
EOSINOPHIL # BLD AUTO: 0.03 K/UL — SIGNIFICANT CHANGE UP (ref 0–0.5)
EOSINOPHIL NFR BLD AUTO: 0.2 % — SIGNIFICANT CHANGE UP (ref 0–6)
HCT VFR BLD CALC: 29.4 % — LOW (ref 34.5–45)
HGB BLD-MCNC: 9.8 G/DL — LOW (ref 11.5–15.5)
IANC: 9.21 K/UL — HIGH (ref 1.8–7.4)
IMM GRANULOCYTES NFR BLD AUTO: 0.8 % — SIGNIFICANT CHANGE UP (ref 0–1.5)
LYMPHOCYTES # BLD AUTO: 19.3 % — SIGNIFICANT CHANGE UP (ref 13–44)
LYMPHOCYTES # BLD AUTO: 2.56 K/UL — SIGNIFICANT CHANGE UP (ref 1–3.3)
MCHC RBC-ENTMCNC: 28.6 PG — SIGNIFICANT CHANGE UP (ref 27–34)
MCHC RBC-ENTMCNC: 33.3 GM/DL — SIGNIFICANT CHANGE UP (ref 32–36)
MCV RBC AUTO: 85.7 FL — SIGNIFICANT CHANGE UP (ref 80–100)
MONOCYTES # BLD AUTO: 1.34 K/UL — HIGH (ref 0–0.9)
MONOCYTES NFR BLD AUTO: 10.1 % — SIGNIFICANT CHANGE UP (ref 2–14)
NEUTROPHILS # BLD AUTO: 9.21 K/UL — HIGH (ref 1.8–7.4)
NEUTROPHILS NFR BLD AUTO: 69.3 % — SIGNIFICANT CHANGE UP (ref 43–77)
NRBC # BLD: 0 /100 WBCS — SIGNIFICANT CHANGE UP
NRBC # FLD: 0 K/UL — SIGNIFICANT CHANGE UP
PLATELET # BLD AUTO: 205 K/UL — SIGNIFICANT CHANGE UP (ref 150–400)
RBC # BLD: 3.43 M/UL — LOW (ref 3.8–5.2)
RBC # FLD: 13.5 % — SIGNIFICANT CHANGE UP (ref 10.3–14.5)
WBC # BLD: 13.29 K/UL — HIGH (ref 3.8–10.5)
WBC # FLD AUTO: 13.29 K/UL — HIGH (ref 3.8–10.5)

## 2022-05-10 RX ORDER — OXYCODONE HYDROCHLORIDE 5 MG/1
5 TABLET ORAL
Refills: 0 | Status: DISCONTINUED | OUTPATIENT
Start: 2022-05-10 | End: 2022-05-12

## 2022-05-10 RX ORDER — IBUPROFEN 200 MG
600 TABLET ORAL EVERY 6 HOURS
Refills: 0 | Status: DISCONTINUED | OUTPATIENT
Start: 2022-05-10 | End: 2022-05-12

## 2022-05-10 RX ADMIN — HEPARIN SODIUM 5000 UNIT(S): 5000 INJECTION INTRAVENOUS; SUBCUTANEOUS at 17:57

## 2022-05-10 RX ADMIN — Medication 30 MILLIGRAM(S): at 05:44

## 2022-05-10 RX ADMIN — Medication 975 MILLIGRAM(S): at 02:51

## 2022-05-10 RX ADMIN — Medication 975 MILLIGRAM(S): at 08:15

## 2022-05-10 RX ADMIN — Medication 325 MILLIGRAM(S): at 12:41

## 2022-05-10 RX ADMIN — Medication 600 MILLIGRAM(S): at 13:30

## 2022-05-10 RX ADMIN — Medication 30 MILLIGRAM(S): at 00:24

## 2022-05-10 RX ADMIN — Medication 600 MILLIGRAM(S): at 17:57

## 2022-05-10 RX ADMIN — OXYCODONE HYDROCHLORIDE 5 MILLIGRAM(S): 5 TABLET ORAL at 14:14

## 2022-05-10 RX ADMIN — OXYCODONE HYDROCHLORIDE 5 MILLIGRAM(S): 5 TABLET ORAL at 15:10

## 2022-05-10 RX ADMIN — Medication 600 MILLIGRAM(S): at 12:40

## 2022-05-10 RX ADMIN — Medication 975 MILLIGRAM(S): at 21:03

## 2022-05-10 RX ADMIN — Medication 1 MILLIGRAM(S): at 12:40

## 2022-05-10 RX ADMIN — Medication 975 MILLIGRAM(S): at 03:30

## 2022-05-10 RX ADMIN — Medication 600 MILLIGRAM(S): at 18:31

## 2022-05-10 RX ADMIN — Medication 975 MILLIGRAM(S): at 09:00

## 2022-05-10 RX ADMIN — Medication 975 MILLIGRAM(S): at 22:01

## 2022-05-10 RX ADMIN — Medication 30 MILLIGRAM(S): at 06:00

## 2022-05-10 RX ADMIN — Medication 1 TABLET(S): at 12:41

## 2022-05-10 RX ADMIN — HEPARIN SODIUM 5000 UNIT(S): 5000 INJECTION INTRAVENOUS; SUBCUTANEOUS at 05:44

## 2022-05-10 RX ADMIN — TETANUS TOXOID, REDUCED DIPHTHERIA TOXOID AND ACELLULAR PERTUSSIS VACCINE, ADSORBED 0.5 MILLILITER(S): 5; 2.5; 8; 8; 2.5 SUSPENSION INTRAMUSCULAR at 02:56

## 2022-05-10 NOTE — PROGRESS NOTE ADULT - SUBJECTIVE AND OBJECTIVE BOX
OB Progress Note:  Delivery, POD#1    S: 32yo POD#1 s/p rLTCS +BS. Pain well controlled, tolerating regular diet, not yet passing flatus, ambulating without difficulty, voiding spontaneously. Denies heavy vaginal bleeding, N/V, CP/SOB/lightheadedness/dizziness, headache, visual disturbances, epigastric pain.     O:   Vital Signs Last 24 Hrs  T(C): 36.7 (10 May 2022 01:38), Max: 36.9 (09 May 2022 17:05)  T(F): 98.1 (10 May 2022 01:38), Max: 98.5 (09 May 2022 17:05)  HR: 79 (10 May 2022 01:38) (56 - 88)  BP: 95/53 (10 May 2022 01:38) (80/40 - 114/87)  BP(mean): 73 (09 May 2022 16:30) (51 - 94)  RR: 18 (10 May 2022 01:38) (11 - 21)  SpO2: 100% (10 May 2022 01:38) (98% - 100%)    Labs:  Blood type: A Positive  Rubella IgG: RPR: Negative                          11.9   15.91<H> >-----------< 181    (  @ 14:29 )             35.9                        11.8   9.23 >-----------< 196    (  @ 08:54 )             35.2        PE:  General: NAD  Abdomen: Mildly distended, appropriately tender, Fundus firm, incision c/d/i.  VE: No heavy vaginal bleeding  Extremities: No erythema, no pitting edema

## 2022-05-10 NOTE — PROGRESS NOTE ADULT - ATTENDING COMMENTS
agree w/ above A/P  patient meeting appropriate POD1 milestones  Hgb 11.9 -> 9.8, VSS  encourage, ambulation, PO hydration, adequate pain control   discharge planning

## 2022-05-10 NOTE — PROGRESS NOTE ADULT - ASSESSMENT
A/P: 32yo POD#1 s/p rLTCS +BS. . Patient is stable and doing well post-operatively.    - Continue regular diet  - Increase ambulation  - Continue motrin, tylenol, oxycodone PRN for pain control.    - f/u AM CBC    Meredith De La O, PGY1

## 2022-05-11 RX ADMIN — Medication 1 TABLET(S): at 12:25

## 2022-05-11 RX ADMIN — Medication 600 MILLIGRAM(S): at 05:37

## 2022-05-11 RX ADMIN — HEPARIN SODIUM 5000 UNIT(S): 5000 INJECTION INTRAVENOUS; SUBCUTANEOUS at 18:51

## 2022-05-11 RX ADMIN — HEPARIN SODIUM 5000 UNIT(S): 5000 INJECTION INTRAVENOUS; SUBCUTANEOUS at 05:38

## 2022-05-11 RX ADMIN — SIMETHICONE 80 MILLIGRAM(S): 80 TABLET, CHEWABLE ORAL at 09:36

## 2022-05-11 RX ADMIN — OXYCODONE HYDROCHLORIDE 5 MILLIGRAM(S): 5 TABLET ORAL at 02:24

## 2022-05-11 RX ADMIN — Medication 975 MILLIGRAM(S): at 03:20

## 2022-05-11 RX ADMIN — OXYCODONE HYDROCHLORIDE 5 MILLIGRAM(S): 5 TABLET ORAL at 10:20

## 2022-05-11 RX ADMIN — Medication 975 MILLIGRAM(S): at 21:20

## 2022-05-11 RX ADMIN — Medication 600 MILLIGRAM(S): at 12:24

## 2022-05-11 RX ADMIN — Medication 600 MILLIGRAM(S): at 18:50

## 2022-05-11 RX ADMIN — OXYCODONE HYDROCHLORIDE 5 MILLIGRAM(S): 5 TABLET ORAL at 09:36

## 2022-05-11 RX ADMIN — Medication 975 MILLIGRAM(S): at 02:21

## 2022-05-11 RX ADMIN — Medication 975 MILLIGRAM(S): at 09:31

## 2022-05-11 RX ADMIN — Medication 1 MILLIGRAM(S): at 12:25

## 2022-05-11 RX ADMIN — OXYCODONE HYDROCHLORIDE 5 MILLIGRAM(S): 5 TABLET ORAL at 03:20

## 2022-05-11 RX ADMIN — Medication 975 MILLIGRAM(S): at 20:33

## 2022-05-11 RX ADMIN — Medication 600 MILLIGRAM(S): at 06:27

## 2022-05-11 RX ADMIN — Medication 325 MILLIGRAM(S): at 12:25

## 2022-05-12 VITALS
OXYGEN SATURATION: 99 % | TEMPERATURE: 98 F | SYSTOLIC BLOOD PRESSURE: 107 MMHG | RESPIRATION RATE: 17 BRPM | DIASTOLIC BLOOD PRESSURE: 73 MMHG | HEART RATE: 77 BPM

## 2022-05-12 RX ORDER — FERROUS SULFATE 325(65) MG
325 TABLET ORAL THREE TIMES A DAY
Refills: 0 | Status: DISCONTINUED | OUTPATIENT
Start: 2022-05-12 | End: 2022-05-12

## 2022-05-12 RX ORDER — ASCORBIC ACID 60 MG
1 TABLET,CHEWABLE ORAL
Qty: 0 | Refills: 0 | DISCHARGE
Start: 2022-05-12

## 2022-05-12 RX ORDER — SENNA PLUS 8.6 MG/1
1 TABLET ORAL
Refills: 0 | Status: DISCONTINUED | OUTPATIENT
Start: 2022-05-12 | End: 2022-05-12

## 2022-05-12 RX ORDER — ASCORBIC ACID 60 MG
500 TABLET,CHEWABLE ORAL DAILY
Refills: 0 | Status: DISCONTINUED | OUTPATIENT
Start: 2022-05-12 | End: 2022-05-12

## 2022-05-12 RX ORDER — FERROUS SULFATE 325(65) MG
1 TABLET ORAL
Qty: 0 | Refills: 0 | DISCHARGE
Start: 2022-05-12

## 2022-05-12 RX ADMIN — Medication 975 MILLIGRAM(S): at 03:23

## 2022-05-12 RX ADMIN — Medication 325 MILLIGRAM(S): at 14:54

## 2022-05-12 RX ADMIN — HEPARIN SODIUM 5000 UNIT(S): 5000 INJECTION INTRAVENOUS; SUBCUTANEOUS at 06:34

## 2022-05-12 RX ADMIN — SENNA PLUS 1 TABLET(S): 8.6 TABLET ORAL at 06:34

## 2022-05-12 RX ADMIN — Medication 500 MILLIGRAM(S): at 11:37

## 2022-05-12 RX ADMIN — Medication 600 MILLIGRAM(S): at 11:37

## 2022-05-12 RX ADMIN — Medication 600 MILLIGRAM(S): at 00:45

## 2022-05-12 RX ADMIN — Medication 975 MILLIGRAM(S): at 02:39

## 2022-05-12 RX ADMIN — Medication 1 MILLIGRAM(S): at 11:37

## 2022-05-12 RX ADMIN — Medication 975 MILLIGRAM(S): at 15:23

## 2022-05-12 RX ADMIN — Medication 600 MILLIGRAM(S): at 06:34

## 2022-05-12 RX ADMIN — Medication 1 TABLET(S): at 11:38

## 2022-05-12 RX ADMIN — Medication 600 MILLIGRAM(S): at 01:30

## 2022-05-12 RX ADMIN — MAGNESIUM HYDROXIDE 30 MILLILITER(S): 400 TABLET, CHEWABLE ORAL at 00:46

## 2022-05-12 RX ADMIN — SIMETHICONE 80 MILLIGRAM(S): 80 TABLET, CHEWABLE ORAL at 00:46

## 2022-05-12 RX ADMIN — Medication 600 MILLIGRAM(S): at 12:30

## 2022-05-12 NOTE — PROGRESS NOTE ADULT - SUBJECTIVE AND OBJECTIVE BOX
Post-Operative Note, C/S  She is a  31y woman who is now post-operative day: 3    Subjective:  The patient feels well.  She is ambulating.   She is tolerating regular diet.  She denies nausea and vomiting; denies fever.  She is voiding.  Her pain is controlled; incisional pain is appropriate.  She reports normal postpartum bleeding.  She is breastfeeding.  She is formula feeding.    Physical exam:    Vital Signs Last 24 Hrs  T(C): 36.4 (12 May 2022 06:22), Max: 37.2 (11 May 2022 21:42)  T(F): 97.6 (12 May 2022 06:22), Max: 99 (11 May 2022 21:42)  HR: 82 (12 May 2022 06:22) (82 - 100)  BP: 102/60 (12 May 2022 06:22) (102/60 - 110/72)  BP(mean): --  RR: 18 (12 May 2022 06:22) (18 - 18)  SpO2: 99% (12 May 2022 06:22) (99% - 99%)    Gen: NAD  Breast: Soft, nontender, not engorged.  Abdomen: Soft, nontender, no distension , firm uterine fundus at umbilicus.  Incision: C/D/I.  Pelvic: Normal lochia noted  Ext: No calf tenderness    LABS:      Rubella status:     Allergies    No Known Allergies    Intolerances      MEDICATIONS  (STANDING):  acetaminophen     Tablet .. 975 milliGRAM(s) Oral <User Schedule>  acetaminophen   IVPB .. 1000 milliGRAM(s) IV Intermittent once  ascorbic acid 500 milliGRAM(s) Oral daily  ferrous    sulfate 325 milliGRAM(s) Oral three times a day  folic acid 1 milliGRAM(s) Oral daily  heparin   Injectable 5000 Unit(s) SubCutaneous every 12 hours  ibuprofen  Tablet. 600 milliGRAM(s) Oral every 6 hours  prenatal multivitamin 1 Tablet(s) Oral daily  senna 1 Tablet(s) Oral two times a day    MEDICATIONS  (PRN):  diphenhydrAMINE 25 milliGRAM(s) Oral every 6 hours PRN Pruritus  lanolin Ointment 1 Application(s) Topical every 6 hours PRN Sore Nipples  magnesium hydroxide Suspension 30 milliLiter(s) Oral two times a day PRN Constipation  oxyCODONE    IR 5 milliGRAM(s) Oral once PRN Moderate to Severe Pain (4-10)  oxyCODONE    IR 5 milliGRAM(s) Oral every 3 hours PRN Moderate to Severe Pain (4-10)  simethicone 80 milliGRAM(s) Chew every 4 hours PRN Gas

## 2022-05-12 NOTE — PROGRESS NOTE ADULT - ASSESSMENT
Assessment and Plan  POD #3 s/p R/C/S with BS.     Patient was not in PP room 610. Patient seen in NICU  where baby remains on CPAP and mother is at the infants bedside.     Her pain is well controlled.   She is tolerating a regular diet and passing flatus.   Denies N/V. Denies CP/SOB/lightheadedness/dizziness.   She is ambulating without difficulty.   Voiding spontaneously.    Patient is stable and doing well post-operatively.    - Continue regular diet.  - Increase ambulation.  - Continue motrin, tylenol, oxycodone PRN for pain control.   -Encourage breastfeeding.  -Incisional care and PO instructions reviewed.     Follow up @ [facility] in 2 weeks for incision check visit  [phone]    Discussed with MD Ish Garcia

## 2022-05-13 LAB — SURGICAL PATHOLOGY STUDY: SIGNIFICANT CHANGE UP

## 2022-07-27 NOTE — OB RN PATIENT PROFILE - THE IMPORTANCE OF THE NEWBORN'S COMFORT AND THERMOREGULATION DURING SKIN TO SKIN: ANY PART OF INFANT SKIN NOT TOUCHING PARENT'S SKIN IS TO BE COVERED BY A BLANKET.
Prep Survey    Flowsheet Row Responses   Restoration facility patient discharged from? [facility]   ALFREDO Dean Registered Nurse        
Statement Selected

## 2024-02-20 PROBLEM — Z86.2 PERSONAL HISTORY OF DISEASES OF THE BLOOD AND BLOOD-FORMING ORGANS AND CERTAIN DISORDERS INVOLVING THE IMMUNE MECHANISM: Chronic | Status: ACTIVE | Noted: 2022-04-25

## 2024-02-20 PROBLEM — D57.3 SICKLE-CELL TRAIT: Chronic | Status: ACTIVE | Noted: 2022-04-25

## 2024-04-23 ENCOUNTER — LABORATORY RESULT (OUTPATIENT)
Age: 34
End: 2024-04-23

## 2024-04-23 ENCOUNTER — APPOINTMENT (OUTPATIENT)
Dept: INTERNAL MEDICINE | Facility: CLINIC | Age: 34
End: 2024-04-23
Payer: MEDICAID

## 2024-04-23 VITALS
HEART RATE: 69 BPM | BODY MASS INDEX: 20.25 KG/M2 | TEMPERATURE: 97 F | DIASTOLIC BLOOD PRESSURE: 62 MMHG | OXYGEN SATURATION: 99 % | WEIGHT: 126 LBS | SYSTOLIC BLOOD PRESSURE: 99 MMHG | RESPIRATION RATE: 16 BRPM | HEIGHT: 66 IN

## 2024-04-23 DIAGNOSIS — Z00.00 ENCOUNTER FOR GENERAL ADULT MEDICAL EXAMINATION W/OUT ABNORMAL FINDINGS: ICD-10-CM

## 2024-04-23 DIAGNOSIS — Z78.9 OTHER SPECIFIED HEALTH STATUS: ICD-10-CM

## 2024-04-23 DIAGNOSIS — Z91.89 OTHER SPECIFIED PERSONAL RISK FACTORS, NOT ELSEWHERE CLASSIFIED: ICD-10-CM

## 2024-04-23 DIAGNOSIS — H60.509 UNSPECIFIED ACUTE NONINFECTIVE OTITIS EXTERNA, UNSPECIFIED EAR: ICD-10-CM

## 2024-04-23 PROCEDURE — 99213 OFFICE O/P EST LOW 20 MIN: CPT

## 2024-04-23 PROCEDURE — 99385 PREV VISIT NEW AGE 18-39: CPT

## 2024-04-23 RX ORDER — CIPROFLOXACIN AND DEXAMETHASONE 3; 1 MG/ML; MG/ML
0.3-0.1 SUSPENSION/ DROPS AURICULAR (OTIC)
Qty: 1 | Refills: 0 | Status: ACTIVE | COMMUNITY
Start: 2024-04-23 | End: 1900-01-01

## 2024-04-24 LAB
ALBUMIN SERPL ELPH-MCNC: 4.8 G/DL
ALP BLD-CCNC: 83 U/L
ALT SERPL-CCNC: 13 U/L
ANION GAP SERPL CALC-SCNC: 15 MMOL/L
APPEARANCE: CLEAR
AST SERPL-CCNC: 17 U/L
BILIRUB SERPL-MCNC: 0.2 MG/DL
BILIRUBIN URINE: NEGATIVE
BLOOD URINE: ABNORMAL
BUN SERPL-MCNC: 11 MG/DL
CALCIUM SERPL-MCNC: 9.5 MG/DL
CHLORIDE SERPL-SCNC: 103 MMOL/L
CHOLEST SERPL-MCNC: 149 MG/DL
CO2 SERPL-SCNC: 23 MMOL/L
COLOR: YELLOW
CREAT SERPL-MCNC: 0.72 MG/DL
EGFR: 114 ML/MIN/1.73M2
GLUCOSE QUALITATIVE U: NEGATIVE MG/DL
GLUCOSE SERPL-MCNC: 94 MG/DL
HCT VFR BLD CALC: 37.9 %
HDLC SERPL-MCNC: 66 MG/DL
HGB BLD-MCNC: 12.6 G/DL
IRON SATN MFR SERPL: 38 %
IRON SERPL-MCNC: 112 UG/DL
KETONES URINE: NEGATIVE MG/DL
LDLC SERPL CALC-MCNC: 67 MG/DL
LEUKOCYTE ESTERASE URINE: NEGATIVE
MCHC RBC-ENTMCNC: 28.4 PG
MCHC RBC-ENTMCNC: 33.2 GM/DL
MCV RBC AUTO: 85.6 FL
NITRITE URINE: NEGATIVE
NONHDLC SERPL-MCNC: 83 MG/DL
PH URINE: 6
PLATELET # BLD AUTO: 263 K/UL
POTASSIUM SERPL-SCNC: 4.3 MMOL/L
PROT SERPL-MCNC: 7.6 G/DL
PROTEIN URINE: NEGATIVE MG/DL
RBC # BLD: 4.43 M/UL
RBC # FLD: 13.2 %
SODIUM SERPL-SCNC: 140 MMOL/L
SPECIFIC GRAVITY URINE: 1.01
TIBC SERPL-MCNC: 293 UG/DL
TRIGL SERPL-MCNC: 89 MG/DL
TSH SERPL-ACNC: 1.67 UIU/ML
UIBC SERPL-MCNC: 181 UG/DL
UROBILINOGEN URINE: 0.2 MG/DL
VIT B12 SERPL-MCNC: 606 PG/ML
WBC # FLD AUTO: 6.42 K/UL

## 2024-04-25 LAB — 25(OH)D3 SERPL-MCNC: 22.7 NG/ML

## 2024-07-10 NOTE — OB RN PATIENT PROFILE - PRO FEEDING PLAN INFANT OB
"Anna called and wants to speak with some one regarding Lares treatment .  She stated that Ceasar no longer wants to continue the chemo, or at least want more time in between, as it is \"not treating him well\".      Please call her back as soon as possible.  She doesn't want to take up a chair for another patient if he doesn't want to come.      She can be reached at [phone]  "
initiation of breastfeeding/breast milk feeding

## 2025-05-15 ENCOUNTER — TRANSCRIPTION ENCOUNTER (OUTPATIENT)
Age: 35
End: 2025-05-15

## 2025-05-15 ENCOUNTER — NON-APPOINTMENT (OUTPATIENT)
Age: 35
End: 2025-05-15

## 2025-05-15 ENCOUNTER — APPOINTMENT (OUTPATIENT)
Dept: INTERNAL MEDICINE | Facility: CLINIC | Age: 35
End: 2025-05-15
Payer: COMMERCIAL

## 2025-05-15 VITALS
TEMPERATURE: 97.9 F | DIASTOLIC BLOOD PRESSURE: 58 MMHG | BODY MASS INDEX: 19.13 KG/M2 | SYSTOLIC BLOOD PRESSURE: 81 MMHG | HEART RATE: 73 BPM | HEIGHT: 66 IN | OXYGEN SATURATION: 100 % | RESPIRATION RATE: 16 BRPM | WEIGHT: 119 LBS

## 2025-05-15 DIAGNOSIS — Z00.00 ENCOUNTER FOR GENERAL ADULT MEDICAL EXAMINATION W/OUT ABNORMAL FINDINGS: ICD-10-CM

## 2025-05-15 PROCEDURE — 99395 PREV VISIT EST AGE 18-39: CPT

## 2025-05-16 LAB
ALBUMIN SERPL ELPH-MCNC: 4.8 G/DL
ALP BLD-CCNC: 40 U/L
ALT SERPL-CCNC: 24 U/L
ANION GAP SERPL CALC-SCNC: 14 MMOL/L
APPEARANCE: CLEAR
AST SERPL-CCNC: 24 U/L
BILIRUB SERPL-MCNC: 0.3 MG/DL
BILIRUBIN URINE: NEGATIVE
BLOOD URINE: NEGATIVE
BUN SERPL-MCNC: 11 MG/DL
CALCIUM SERPL-MCNC: 9.7 MG/DL
CHLORIDE SERPL-SCNC: 101 MMOL/L
CHOLEST SERPL-MCNC: 183 MG/DL
CO2 SERPL-SCNC: 22 MMOL/L
COLOR: YELLOW
CREAT SERPL-MCNC: 0.79 MG/DL
EGFRCR SERPLBLD CKD-EPI 2021: 101 ML/MIN/1.73M2
GLUCOSE QUALITATIVE U: NEGATIVE MG/DL
GLUCOSE SERPL-MCNC: 83 MG/DL
HCT VFR BLD CALC: 37.8 %
HDLC SERPL-MCNC: 64 MG/DL
HGB BLD-MCNC: 12.2 G/DL
KETONES URINE: NEGATIVE MG/DL
LDLC SERPL-MCNC: 108 MG/DL
LEUKOCYTE ESTERASE URINE: NEGATIVE
MCHC RBC-ENTMCNC: 27.5 PG
MCHC RBC-ENTMCNC: 32.3 G/DL
MCV RBC AUTO: 85.3 FL
NITRITE URINE: NEGATIVE
NONHDLC SERPL-MCNC: 119 MG/DL
PH URINE: 6.5
PLATELET # BLD AUTO: 229 K/UL
POTASSIUM SERPL-SCNC: 4.5 MMOL/L
PROT SERPL-MCNC: 7.6 G/DL
PROTEIN URINE: NEGATIVE MG/DL
RBC # BLD: 4.43 M/UL
RBC # FLD: 13.5 %
SODIUM SERPL-SCNC: 138 MMOL/L
SPECIFIC GRAVITY URINE: 1.02
TRIGL SERPL-MCNC: 57 MG/DL
TSH SERPL-ACNC: 1.39 UIU/ML
UROBILINOGEN URINE: 0.2 MG/DL
WBC # FLD AUTO: 6.23 K/UL

## 2025-05-19 ENCOUNTER — TRANSCRIPTION ENCOUNTER (OUTPATIENT)
Age: 35
End: 2025-05-19

## 2025-05-19 LAB — 25(OH)D3 SERPL-MCNC: 18.7 NG/ML

## 2025-06-23 ENCOUNTER — INPATIENT (INPATIENT)
Facility: HOSPITAL | Age: 35
LOS: 3 days | Discharge: HOME | End: 2025-06-27
Attending: INTERNAL MEDICINE | Admitting: INTERNAL MEDICINE
Payer: COMMERCIAL

## 2025-06-23 VITALS
TEMPERATURE: 99 F | DIASTOLIC BLOOD PRESSURE: 62 MMHG | WEIGHT: 119.93 LBS | HEIGHT: 65 IN | OXYGEN SATURATION: 99 % | HEART RATE: 92 BPM | RESPIRATION RATE: 16 BRPM | SYSTOLIC BLOOD PRESSURE: 101 MMHG

## 2025-06-23 DIAGNOSIS — Z98.891 HISTORY OF UTERINE SCAR FROM PREVIOUS SURGERY: Chronic | ICD-10-CM

## 2025-06-23 PROCEDURE — 62270 DX LMBR SPI PNXR: CPT

## 2025-06-23 PROCEDURE — 99285 EMERGENCY DEPT VISIT HI MDM: CPT | Mod: 25

## 2025-06-23 NOTE — ED PROVIDER NOTE - ATTENDING CONTRIBUTION TO CARE
34F no reported PMH p/w headache, fevers and rash. Symptoms began 2-3 days ago with fevers to 101-102F and headaches. She then noted a rash to groin, buttock and feet the subsequent day. Today felt feverish but did not take temp while at work. Denies any cough, abdominal pain, nausea, vomiting, diarrhea, dysuria, hematuria, or other associated symptoms. No known sick contacts or recent travels. No neck pain, photophobia, recent travels.   GEN: overall well appearing, non-toxic  CV: rrr, no appreciable murmur  PULM: clear lungs  ABD: soft, ntnd  MSK: no midline neck/spinal tenderness, neck supple with no evidence of nuchal rigidity   NEURO: CN 2-12 grossly intact, no gross motor or sensory deficits  SKIN:  petechial rash over left foot, bilateral lower extremities, buttocks and left groin  MDM: 34F no reported PMH p/w headache, fevers and rash. Constellation of symptoms concerning for meningitis though pt overall well appearing and without nuchal rigidity. Headache also resolves with OTC medications. DDx also includes other sources of sepsis such as viral infection, UTI. No cough to suggest bacterial PNA though can obtain CXR. Full sepsis order set including inflammatory markers. IVF ordered. No abx ordered initially as higher suspicion for viral etiology. If workup otherwise negative will likely require LP.

## 2025-06-23 NOTE — ED PROVIDER NOTE - CLINICAL SUMMARY MEDICAL DECISION MAKING FREE TEXT BOX
The patient is a 34-year-old female otherwise healthy who presents for headache fevers and rash.  Patient endorses that her symptoms started on Saturday.  Denies any cough abdominal pain nausea vomiting dysuria hematuria vaginal discharge.  Lives at home with  and 2 kids no recent travel.  Denies any neck pain.    On physical exam patient appears comfortable, no nuchal rigitdity, lungs clear to auscultation bilaterally, heart rate regular rate and rhythm no murmurs rubs or gallops, abdomen soft nontender, petechial rash over left foot bilateral lower extremities and left groin.    Patient has headache and petechial rash but no nuchal rigidity, not altered, moderate suspicion of meningitis, plan for rectal temp CBC CMP CRP ESR to evaluate for need for LP along with joint decision-making patient.  Petechial rash may also be sequela of other viral syndrome or vasculitides, will use lab work and reevaluation to determine dispo and workup.  Plan for chest x-ray flu COVID UA UC. Dispp pending labs and work up.

## 2025-06-23 NOTE — ED ADULT TRIAGE NOTE - CHIEF COMPLAINT QUOTE
c/o bodyaches/ intermittent fevers at home. as well itchiness to lower back, abd area and b/l feet. small red rash noted to top of b/l feet and lower back.. denies any known allergens. denies h/a, dizziness/lightheadedness, cp, sob, abd pain, n/v, diarrhea/constipation, gu sx, falls/trauma. denies past med hx

## 2025-06-24 DIAGNOSIS — D64.9 ANEMIA, UNSPECIFIED: ICD-10-CM

## 2025-06-24 DIAGNOSIS — R50.9 FEVER, UNSPECIFIED: ICD-10-CM

## 2025-06-24 DIAGNOSIS — Z29.9 ENCOUNTER FOR PROPHYLACTIC MEASURES, UNSPECIFIED: ICD-10-CM

## 2025-06-24 DIAGNOSIS — R23.3 SPONTANEOUS ECCHYMOSES: ICD-10-CM

## 2025-06-24 DIAGNOSIS — D72.819 DECREASED WHITE BLOOD CELL COUNT, UNSPECIFIED: ICD-10-CM

## 2025-06-24 DIAGNOSIS — D69.6 THROMBOCYTOPENIA, UNSPECIFIED: ICD-10-CM

## 2025-06-24 DIAGNOSIS — D61.818 OTHER PANCYTOPENIA: ICD-10-CM

## 2025-06-24 LAB
ADD ON TEST-SPECIMEN IN LAB: SIGNIFICANT CHANGE UP
ADD ON TEST-SPECIMEN IN LAB: SIGNIFICANT CHANGE UP
ALBUMIN SERPL ELPH-MCNC: 4.5 G/DL — SIGNIFICANT CHANGE UP (ref 3.3–5)
ALP SERPL-CCNC: 47 U/L — SIGNIFICANT CHANGE UP (ref 40–120)
ALT FLD-CCNC: 12 U/L — SIGNIFICANT CHANGE UP (ref 4–33)
ANION GAP SERPL CALC-SCNC: 11 MMOL/L — SIGNIFICANT CHANGE UP (ref 7–14)
APPEARANCE CSF: ABNORMAL
APPEARANCE SPUN FLD: COLORLESS — SIGNIFICANT CHANGE UP
APPEARANCE UR: CLEAR — SIGNIFICANT CHANGE UP
AST SERPL-CCNC: 20 U/L — SIGNIFICANT CHANGE UP (ref 4–32)
BACTERIA # UR AUTO: NEGATIVE /HPF — SIGNIFICANT CHANGE UP
BASOPHILS # BLD AUTO: 0 K/UL — SIGNIFICANT CHANGE UP (ref 0–0.2)
BASOPHILS # BLD MANUAL: 0 K/UL — SIGNIFICANT CHANGE UP (ref 0–0.2)
BASOPHILS NFR BLD AUTO: 0 % — SIGNIFICANT CHANGE UP (ref 0–2)
BASOPHILS NFR BLD MANUAL: 0 % — SIGNIFICANT CHANGE UP (ref 0–2)
BILIRUB SERPL-MCNC: <0.2 MG/DL — SIGNIFICANT CHANGE UP (ref 0.2–1.2)
BILIRUB UR-MCNC: NEGATIVE — SIGNIFICANT CHANGE UP
BUN SERPL-MCNC: 17 MG/DL — SIGNIFICANT CHANGE UP (ref 7–23)
CALCIUM SERPL-MCNC: 9.1 MG/DL — SIGNIFICANT CHANGE UP (ref 8.4–10.5)
CAST: 1 /LPF — SIGNIFICANT CHANGE UP (ref 0–4)
CHLORIDE SERPL-SCNC: 100 MMOL/L — SIGNIFICANT CHANGE UP (ref 98–107)
CO2 SERPL-SCNC: 24 MMOL/L — SIGNIFICANT CHANGE UP (ref 22–31)
COLOR CSF: ABNORMAL
COLOR SPEC: YELLOW — SIGNIFICANT CHANGE UP
CREAT SERPL-MCNC: 0.77 MG/DL — SIGNIFICANT CHANGE UP (ref 0.5–1.3)
CRP SERPL-MCNC: 7.4 MG/L — HIGH
CSF PCR RESULT: SIGNIFICANT CHANGE UP
DIFF PNL FLD: NEGATIVE — SIGNIFICANT CHANGE UP
EGFR: 104 ML/MIN/1.73M2 — SIGNIFICANT CHANGE UP
EGFR: 104 ML/MIN/1.73M2 — SIGNIFICANT CHANGE UP
EOSINOPHIL # BLD AUTO: 0.13 K/UL — SIGNIFICANT CHANGE UP (ref 0–0.5)
EOSINOPHIL # BLD MANUAL: 0.11 K/UL — SIGNIFICANT CHANGE UP (ref 0–0.5)
EOSINOPHIL # CSF: 4 % — SIGNIFICANT CHANGE UP
EOSINOPHIL NFR BLD AUTO: 7.2 % — HIGH (ref 0–6)
EOSINOPHIL NFR BLD MANUAL: 5.9 % — SIGNIFICANT CHANGE UP (ref 0–6)
ERYTHROCYTE [SEDIMENTATION RATE] IN BLOOD: 14 MM/HR — SIGNIFICANT CHANGE UP (ref 0–20)
FLUAV AG NPH QL: SIGNIFICANT CHANGE UP
FLUBV AG NPH QL: SIGNIFICANT CHANGE UP
GAS PNL BLDV: SIGNIFICANT CHANGE UP
GIANT PLATELETS BLD QL SMEAR: PRESENT
GLUCOSE CSF-MCNC: 59 MG/DL — SIGNIFICANT CHANGE UP (ref 40–70)
GLUCOSE SERPL-MCNC: 91 MG/DL — SIGNIFICANT CHANGE UP (ref 70–99)
GLUCOSE UR QL: NEGATIVE MG/DL — SIGNIFICANT CHANGE UP
GRAM STN FLD: SIGNIFICANT CHANGE UP
HCG SERPL-ACNC: <1 MIU/ML — SIGNIFICANT CHANGE UP
HCT VFR BLD CALC: 34.5 % — SIGNIFICANT CHANGE UP (ref 34.5–45)
HGB BLD-MCNC: 11.3 G/DL — LOW (ref 11.5–15.5)
IMM GRANULOCYTES # BLD AUTO: 0 K/UL — SIGNIFICANT CHANGE UP (ref 0–0.07)
IMM GRANULOCYTES NFR BLD AUTO: 0 % — SIGNIFICANT CHANGE UP (ref 0–0.9)
KETONES UR QL: NEGATIVE MG/DL — SIGNIFICANT CHANGE UP
LEUKOCYTE ESTERASE UR-ACNC: NEGATIVE — SIGNIFICANT CHANGE UP
LYMPHOCYTES # BLD AUTO: 0.38 K/UL — LOW (ref 1–3.3)
LYMPHOCYTES # BLD MANUAL: 0.3 K/UL — LOW (ref 1–3.3)
LYMPHOCYTES # CSF: 88 % — SIGNIFICANT CHANGE UP
LYMPHOCYTES NFR BLD AUTO: 21.1 % — SIGNIFICANT CHANGE UP (ref 13–44)
LYMPHOCYTES NFR BLD MANUAL: 16.8 % — SIGNIFICANT CHANGE UP (ref 13–44)
MAGNESIUM SERPL-MCNC: 2 MG/DL — SIGNIFICANT CHANGE UP (ref 1.6–2.6)
MANUAL METAMYELOCYTE #: 0.01 K/UL — HIGH (ref 0–0)
MANUAL NEUTROPHIL BANDS #: 0.35 K/UL — SIGNIFICANT CHANGE UP (ref 0–0.84)
MCHC RBC-ENTMCNC: 27.2 PG — SIGNIFICANT CHANGE UP (ref 27–34)
MCHC RBC-ENTMCNC: 32.8 G/DL — SIGNIFICANT CHANGE UP (ref 32–36)
MCV RBC AUTO: 82.9 FL — SIGNIFICANT CHANGE UP (ref 80–100)
METAMYELOCYTES # FLD: 0.8 % — HIGH (ref 0–0)
METAMYELOCYTES NFR BLD: 0.8 % — HIGH (ref 0–0)
MONOCYTES # BLD AUTO: 0.26 K/UL — SIGNIFICANT CHANGE UP (ref 0–0.9)
MONOCYTES # BLD MANUAL: 0.12 K/UL — SIGNIFICANT CHANGE UP (ref 0–0.9)
MONOCYTES NFR BLD AUTO: 14.4 % — HIGH (ref 2–14)
MONOCYTES NFR BLD MANUAL: 6.7 % — SIGNIFICANT CHANGE UP (ref 2–14)
MONOS+MACROS NFR CSF: 4 % — SIGNIFICANT CHANGE UP
NEUTROPHILS # BLD AUTO: 1.03 K/UL — LOW (ref 1.8–7.4)
NEUTROPHILS # BLD MANUAL: 0.91 K/UL — LOW (ref 1.8–7.4)
NEUTROPHILS # CSF: 4 % — SIGNIFICANT CHANGE UP
NEUTROPHILS NFR BLD AUTO: 57.3 % — SIGNIFICANT CHANGE UP (ref 43–77)
NEUTROPHILS NFR BLD MANUAL: 50.5 % — SIGNIFICANT CHANGE UP (ref 43–77)
NEUTS BAND # BLD: 19.3 % — CRITICAL HIGH (ref 0–8)
NEUTS BAND NFR BLD: 19.3 % — CRITICAL HIGH (ref 0–8)
NITRITE UR-MCNC: NEGATIVE — SIGNIFICANT CHANGE UP
NRBC # BLD AUTO: 0 K/UL — SIGNIFICANT CHANGE UP (ref 0–0)
NRBC # FLD: 0 K/UL — SIGNIFICANT CHANGE UP (ref 0–0)
NRBC BLD AUTO-RTO: 0 /100 WBCS — SIGNIFICANT CHANGE UP (ref 0–0)
NRBC NFR CSF: 4 CELLS/UL — SIGNIFICANT CHANGE UP (ref 0–5)
PH UR: 6.5 — SIGNIFICANT CHANGE UP (ref 5–8)
PHOSPHATE SERPL-MCNC: 4 MG/DL — SIGNIFICANT CHANGE UP (ref 2.5–4.5)
PLAT MORPH BLD: NORMAL — SIGNIFICANT CHANGE UP
PLATELET # BLD AUTO: 146 K/UL — LOW (ref 150–400)
PLATELET COUNT - ESTIMATE: NORMAL — SIGNIFICANT CHANGE UP
PMV BLD: 11 FL — SIGNIFICANT CHANGE UP (ref 7–13)
POTASSIUM SERPL-MCNC: 3.8 MMOL/L — SIGNIFICANT CHANGE UP (ref 3.5–5.3)
POTASSIUM SERPL-SCNC: 3.8 MMOL/L — SIGNIFICANT CHANGE UP (ref 3.5–5.3)
PROT CSF-MCNC: 196 MG/DL — HIGH (ref 15–45)
PROT SERPL-MCNC: 8 G/DL — SIGNIFICANT CHANGE UP (ref 6–8.3)
PROT UR-MCNC: NEGATIVE MG/DL — SIGNIFICANT CHANGE UP
RBC # BLD: 4.16 M/UL — SIGNIFICANT CHANGE UP (ref 3.8–5.2)
RBC # CSF: 9444 CELLS/UL — HIGH (ref 0–0)
RBC # FLD: 12.4 % — SIGNIFICANT CHANGE UP (ref 10.3–14.5)
RBC BLD AUTO: NORMAL — SIGNIFICANT CHANGE UP
RBC CASTS # UR COMP ASSIST: 3 /HPF — SIGNIFICANT CHANGE UP (ref 0–4)
RSV RNA NPH QL NAA+NON-PROBE: SIGNIFICANT CHANGE UP
SARS-COV-2 RNA SPEC QL NAA+PROBE: SIGNIFICANT CHANGE UP
SMUDGE CELLS # BLD: PRESENT
SODIUM SERPL-SCNC: 135 MMOL/L — SIGNIFICANT CHANGE UP (ref 135–145)
SOURCE RESPIRATORY: SIGNIFICANT CHANGE UP
SP GR SPEC: 1.01 — SIGNIFICANT CHANGE UP (ref 1–1.03)
SPECIMEN SOURCE: SIGNIFICANT CHANGE UP
SQUAMOUS # UR AUTO: 2 /HPF — SIGNIFICANT CHANGE UP (ref 0–5)
TOTAL CELLS COUNTED, SPINAL FLUID: 100 CELLS — SIGNIFICANT CHANGE UP
TUBE TYPE: SIGNIFICANT CHANGE UP
UROBILINOGEN FLD QL: 0.2 MG/DL — SIGNIFICANT CHANGE UP (ref 0.2–1)
WBC # BLD: 1.8 K/UL — LOW (ref 3.8–10.5)
WBC # FLD AUTO: 1.8 K/UL — LOW (ref 3.8–10.5)
WBC UR QL: 0 /HPF — SIGNIFICANT CHANGE UP (ref 0–5)

## 2025-06-24 PROCEDURE — 70450 CT HEAD/BRAIN W/O DYE: CPT | Mod: 26

## 2025-06-24 PROCEDURE — G0545: CPT

## 2025-06-24 PROCEDURE — 99223 1ST HOSP IP/OBS HIGH 75: CPT | Mod: GC

## 2025-06-24 PROCEDURE — 99222 1ST HOSP IP/OBS MODERATE 55: CPT

## 2025-06-24 PROCEDURE — 71046 X-RAY EXAM CHEST 2 VIEWS: CPT | Mod: 26

## 2025-06-24 RX ORDER — DOXYCYCLINE HYCLATE 100 MG
TABLET ORAL
Refills: 0 | Status: DISCONTINUED | OUTPATIENT
Start: 2025-06-24 | End: 2025-06-24

## 2025-06-24 RX ORDER — DOXYCYCLINE HYCLATE 100 MG
100 TABLET ORAL EVERY 12 HOURS
Refills: 0 | Status: DISCONTINUED | OUTPATIENT
Start: 2025-06-24 | End: 2025-06-27

## 2025-06-24 RX ORDER — VANCOMYCIN HCL IN 5 % DEXTROSE 1.5G/250ML
1000 PLASTIC BAG, INJECTION (ML) INTRAVENOUS ONCE
Refills: 0 | Status: COMPLETED | OUTPATIENT
Start: 2025-06-24 | End: 2025-06-24

## 2025-06-24 RX ORDER — IBUPROFEN 200 MG
200 TABLET ORAL EVERY 6 HOURS
Refills: 0 | Status: DISCONTINUED | OUTPATIENT
Start: 2025-06-24 | End: 2025-06-25

## 2025-06-24 RX ORDER — DOXYCYCLINE HYCLATE 100 MG
100 TABLET ORAL ONCE
Refills: 0 | Status: DISCONTINUED | OUTPATIENT
Start: 2025-06-24 | End: 2025-06-24

## 2025-06-24 RX ORDER — ACETAMINOPHEN 500 MG/5ML
650 LIQUID (ML) ORAL ONCE
Refills: 0 | Status: COMPLETED | OUTPATIENT
Start: 2025-06-24 | End: 2025-06-24

## 2025-06-24 RX ORDER — METOCLOPRAMIDE HCL 10 MG
10 TABLET ORAL ONCE
Refills: 0 | Status: COMPLETED | OUTPATIENT
Start: 2025-06-24 | End: 2025-06-24

## 2025-06-24 RX ORDER — CEFTRIAXONE 500 MG/1
2000 INJECTION, POWDER, FOR SOLUTION INTRAMUSCULAR; INTRAVENOUS EVERY 24 HOURS
Refills: 0 | Status: DISCONTINUED | OUTPATIENT
Start: 2025-06-24 | End: 2025-06-24

## 2025-06-24 RX ORDER — CEFTRIAXONE 500 MG/1
2000 INJECTION, POWDER, FOR SOLUTION INTRAMUSCULAR; INTRAVENOUS ONCE
Refills: 0 | Status: COMPLETED | OUTPATIENT
Start: 2025-06-24 | End: 2025-06-24

## 2025-06-24 RX ORDER — SODIUM CHLORIDE 9 G/1000ML
1000 INJECTION, SOLUTION INTRAVENOUS ONCE
Refills: 0 | Status: COMPLETED | OUTPATIENT
Start: 2025-06-24 | End: 2025-06-24

## 2025-06-24 RX ADMIN — SODIUM CHLORIDE 1000 MILLILITER(S): 9 INJECTION, SOLUTION INTRAVENOUS at 03:25

## 2025-06-24 RX ADMIN — Medication 10 MILLIGRAM(S): at 00:47

## 2025-06-24 RX ADMIN — Medication 650 MILLIGRAM(S): at 09:45

## 2025-06-24 RX ADMIN — Medication 261 MILLIGRAM(S): at 08:35

## 2025-06-24 RX ADMIN — Medication 100 MILLIGRAM(S): at 18:27

## 2025-06-24 RX ADMIN — Medication 200 MILLIGRAM(S): at 14:15

## 2025-06-24 RX ADMIN — Medication 250 MILLIGRAM(S): at 06:36

## 2025-06-24 RX ADMIN — Medication 1 APPLICATION(S): at 18:28

## 2025-06-24 RX ADMIN — CEFTRIAXONE 100 MILLIGRAM(S): 500 INJECTION, POWDER, FOR SOLUTION INTRAMUSCULAR; INTRAVENOUS at 05:31

## 2025-06-24 NOTE — H&P ADULT - NSHPPHYSICALEXAM_GEN_ALL_CORE
VITALS:   T(C): 37.2 (06-24-25 @ 05:56), Max: 37.6 (06-24-25 @ 00:13)  HR: 81 (06-24-25 @ 05:56) (81 - 92)  BP: 95/64 (06-24-25 @ 05:56) (95/64 - 101/62)  RR: 18 (06-24-25 @ 05:56) (16 - 18)  SpO2: 98% (06-24-25 @ 05:56) (98% - 99%)    GENERAL: NAD, lying in bed comfortably  HEAD:  Atraumatic, Normocephalic  EYES: EOMI, PERRLA, conjunctiva and sclera clear  ENT: Moist mucous membranes  NECK: Supple, No JVD  CHEST/LUNG: Clear to auscultation bilaterally; No rales, rhonchi, wheezing, or rubs. Unlabored respirations  HEART: Regular rate and rhythm; No murmurs, rubs, or gallops  ABDOMEN: BSx4; Soft, nontender, nondistended  EXTREMITIES:  2+ Peripheral Pulses, brisk capillary refill. No clubbing, cyanosis, or edema  NERVOUS SYSTEM:  A&Ox3, no focal deficits   SKIN: No rashes or lesions VITALS:   T(C): 37.2 (06-24-25 @ 05:56), Max: 37.6 (06-24-25 @ 00:13)  HR: 81 (06-24-25 @ 05:56) (81 - 92)  BP: 95/64 (06-24-25 @ 05:56) (95/64 - 101/62)  RR: 18 (06-24-25 @ 05:56) (16 - 18)  SpO2: 98% (06-24-25 @ 05:56) (98% - 99%)    GENERAL: NAD, lying in bed comfortably  HEAD:  Atraumatic, Normocephalic  EYES: EOMI, conjunctiva and sclera clear  ENT: Moist mucous membranes, no lesions of the oral cavity  NECK: Supple, no rigidity,   CHEST/LUNG: Clear to auscultation bilaterally; No rales, rhonchi, wheezing, or rubs. Unlabored respirations  HEART: Regular rate and rhythm; No murmurs, rubs, or gallops  ABDOMEN: BSx4; Soft, nontender, nondistended  EXTREMITIES:  2+ Peripheral Pulses, brisk capillary refill. No clubbing, cyanosis, or edema  NERVOUS SYSTEM:  A&Ox3, no focal deficits   SKIN: No rashes or lesions VITALS:   T(C): 37.2 (06-24-25 @ 05:56), Max: 37.6 (06-24-25 @ 00:13)  HR: 81 (06-24-25 @ 05:56) (81 - 92)  BP: 95/64 (06-24-25 @ 05:56) (95/64 - 101/62)  RR: 18 (06-24-25 @ 05:56) (16 - 18)  SpO2: 98% (06-24-25 @ 05:56) (98% - 99%)    GENERAL: NAD, lying in bed comfortably on RA  HEAD:  Atraumatic, Normocephalic  EYES: EOMI, conjunctiva and sclera clear  ENT: Moist mucous membranes, no lesions of the oral cavity  NECK: Supple, no rigidity  CHEST/LUNG: Clear to auscultation bilaterally; No rales, rhonchi, wheezing, or rubs. Unlabored respirations  HEART: Regular rate and rhythm; No murmurs, rubs, or gallops  ABDOMEN: BSx4; Soft, nontender, nondistended  EXTREMITIES: 2+ Peripheral Pulses No clubbing, cyanosis, or edema  NERVOUS SYSTEM:  A&Ox3, no focal deficits; Brudzinski and Kerning's signs negative  SKIN: Nonblanching petechial rash on dorsum of feet, groin and buttocks rash; no rash of chest, back, or face (chaperone: Dr. Darshana Bowen)

## 2025-06-24 NOTE — ED PROCEDURE NOTE - ATTENDING CONTRIBUTION TO CARE
LP performed by resident under my supervision and guidance as above. I was present throughout entirety of procedure.

## 2025-06-24 NOTE — ED ADULT NURSE NOTE - OBJECTIVE STATEMENT
Patient is a 33 y/o female A&O x 4 presenting to the ED after having a rash that started on Saturday in her lower rectal area, beginning between her gluteal folds and spreading to her lower abdomen, groin area, the tops of her feet and her arms. Patient has been having intermittent fevers with a max of 102 on Saturday, reports that she took tylenol but it did not help the pain. The rash is a burning itching sensation but there is no discharge or breaks in the skin noted. it is a flat red rash with no vesicles or pustules. Has been endorsing body aches and pain in her legs and arms. Denies any sick contacts or recent travel. Patient denies any past medical or surgical history. Breathing is even and unlabored, denies any chest pain, dizziness, nausea, SOB, constipation, diarrhea, or changes in urinary habits. One sexual partner who is not experiencing the same rash. 20 gauge placed to the right AC, lab work sent. plan of care ongoing

## 2025-06-24 NOTE — H&P ADULT - NSHPLABSRESULTS_GEN_ALL_CORE
LABS: Personally reviewed labs, imaging, and ECG                          11.3   1.80  )-----------( 146      ( 24 Jun 2025 00:08 )             34.5       06-24    135  |  100  |  17  ----------------------------<  91  3.8   |  24  |  0.77    Ca    9.1      24 Jun 2025 00:08    TPro  8.0  /  Alb  4.5  /  TBili  <0.2  /  DBili  x   /  AST  20  /  ALT  12  /  AlkPhos  47  06-24       LIVER FUNCTIONS - ( 24 Jun 2025 00:08 )  Alb: 4.5 g/dL / Pro: 8.0 g/dL / ALK PHOS: 47 U/L / ALT: 12 U/L / AST: 20 U/L / GGT: x                    Urinalysis Basic - ( 24 Jun 2025 00:08 )    Color: x / Appearance: x / SG: x / pH: x  Gluc: 91 mg/dL / Ketone: x  / Bili: x / Urobili: x   Blood: x / Protein: x / Nitrite: x   Leuk Esterase: x / RBC: x / WBC x   Sq Epi: x / Non Sq Epi: x / Bacteria: x            Lactate Trend            CAPILLARY BLOOD GLUCOSE                RADIOLOGY & ADDITIONAL TESTS:

## 2025-06-24 NOTE — H&P ADULT - HISTORY OF PRESENT ILLNESS
Patient is a 35yo female with PMH anemia presenting with headache, fever, and rash. No neck pain. Symptoms started on Saturday.    In the ED, patient aftebrile and VSS with soft BPs 95/64. Patient noted to be well-appearing without neurological deficits. Labs significant for leukopenia (1.80), mild anemia (11.3), and mild thrombocytopenia (146). With 19.3% bands. CMP unremarkable. CRP elevated at 7.4 but ESR WNL. Lactate WNL. Full RVP negative. CXR clear. LP performed that was bloody with increased protein and normal glucose. CSF PCR negative. While in the ED, patient received ceftriaxone 2g, vancomycin 1g, metoclopramide 10mg, and 1L LR. Patient admitted to medicine for further management. Patient is a 35yo female with PMH anemia presenting with headache, fever, and rash. Patient reports that predominant symptom was groin and buttocks pruritis during which she noticed a rash in the affected areas. Around that time also noticed a rash on the  No neck pain. Symptoms started on Saturday.    In the ED, patient afebrile and VSS with soft BPs 95/64 (normal per patient). Patient noted to be well-appearing without neurological deficits. Labs significant for leukopenia (1.80), mild anemia (11.3), and mild thrombocytopenia (146). With 19.3% bands. CMP unremarkable. CRP elevated at 7.4 but ESR WNL. Lactate WNL. Full RVP negative. CXR clear. LP performed that was bloody with increased protein and normal glucose. CSF PCR negative. While in the ED, patient received ceftriaxone 2g, vancomycin 1g, metoclopramide 10mg, and 1L LR. Patient admitted to medicine for further management. Patient is a 35yo female with PMH anemia presenting with headache, fever, and rash since Saturday. Patient reports that predominant symptom was groin and buttocks pruritis during which she noticed a rash in the affected areas. Around that time also noticed a rash on the dorsum of the feet and proximal upper extremities. During that time developed a 7/10 headache that is relieved by Motrin. It is not the worst headache of her life but is new for her. Her last headache was during pregnancy.  Denies any neck pain, stiffness, nausea, vomiting, or photophobia. Has intermittently had a fever of 101 and 102F at home. She denies other infectious symptoms including rhinorrhea, cough, sore throat, abdominal pain, dysuria, or diarrhea. She is tolerating PO intake. Denies any sick contacts. Has a child in school, but she has not been ill recently.    In the ED, patient afebrile and VSS with soft BPs 95/64 (normal per patient). Patient noted to be well-appearing without neurological deficits. Labs significant for leukopenia (1.80), mild anemia (11.3), and mild thrombocytopenia (146). With 19.3% bands. CMP unremarkable. CRP elevated at 7.4 but ESR WNL. Lactate WNL. Full RVP negative. CXR clear. LP performed that was bloody with increased protein and normal glucose. CSF PCR negative. While in the ED, patient received ceftriaxone 2g, vancomycin 1g, metoclopramide 10mg, and 1L LR. Patient admitted to medicine for further management. Patient is a 35yo female with PMH anemia presenting with headache, fever, and rash since Saturday. Patient reports that predominant symptom was groin and buttocks pruritis during which she noticed a rash in the affected areas. Around that time also noticed a rash on the dorsum of the feet and proximal upper extremities. During that time developed a 7/10 headache that is relieved by Motrin. It is not the worst headache of her life but is new for her. Her last headache was during pregnancy.  Denies any neck pain, stiffness, nausea, vomiting, or photophobia. Has intermittently had a fever of 101 and 102F at home.     She denies other infectious symptoms including rhinorrhea, cough, sore throat, abdominal pain, dysuria, or diarrhea. She is tolerating PO intake. Denies any sick contacts. Has a child in school, but she has not been ill recently. [place] but was after symptoms started. She is up to date on childhood vaccinations to her knowledge.     In the ED, patient afebrile and VSS with soft BPs 95/64 (normal per patient). Patient noted to be well-appearing without neurological deficits. Labs significant for leukopenia (1.80), mild anemia (11.3), and mild thrombocytopenia (146). With 19.3% bands. CMP unremarkable. CRP elevated at 7.4 but ESR WNL. Lactate WNL. Full RVP negative. CXR clear. LP performed that was bloody with increased protein and normal glucose. CSF PCR negative. While in the ED, patient received ceftriaxone 2g, vancomycin 1g, metoclopramide 10mg, and 1L LR. Patient admitted to medicine for further management. Patient is a 35yo female with PMH anemia presenting with headache, fever, and rash since Saturday. Patient reports that predominant symptom was groin and buttocks pruritis during which she noticed a rash in the affected areas. Around that time also noticed a rash on the dorsum of the feet and proximal upper extremities. During that time developed a 7/10 headache that is relieved by Motrin. It is not the worst headache of her life but is new for her. Her last headache was during pregnancy.  Denies any neck pain, stiffness, nausea, vomiting, or photophobia. No episodes of confusion. Has intermittently had a fever of 101 and 102F at home.     She denies other infectious symptoms including rhinorrhea, cough, sore throat, abdominal pain, dysuria, or diarrhea. She is tolerating PO intake. Denies any sick contacts. Has a child in school, but she has not been ill recently. [place] but was after symptoms started. She is up to date on childhood vaccinations to her knowledge.     In the ED, patient afebrile and VSS with soft BPs 95/64 (normal per patient). Patient noted to be well-appearing without neurological deficits. Labs significant for leukopenia (1.80), mild anemia (11.3), and mild thrombocytopenia (146). With 19.3% bands. CMP unremarkable. CRP elevated at 7.4 but ESR WNL. Lactate WNL. Full RVP negative. CXR clear. LP performed that was bloody with increased protein and normal glucose. CSF PCR negative. While in the ED, patient received ceftriaxone 2g, vancomycin 1g, metoclopramide 10mg, and 1L LR. Patient admitted to medicine for further management.

## 2025-06-24 NOTE — ED ADULT NURSE REASSESSMENT NOTE - NS ED NURSE REASSESS COMMENT FT1
report received from previous shift.  v/s as noted, awaiting CT result.  pt appears in no acute distress.  will monitor

## 2025-06-24 NOTE — ED ADULT NURSE REASSESSMENT NOTE - NS ED NURSE REASSESS COMMENT FT1
Pt received from RN EP. Patient resting in stretcher, at baseline mental status, no acute distress noted at this time. Pt reports headache 6/10 pain. Medicated as per MAR. Respirations equal and unlabored. Care plan continued. Comfort measures provided. Safety maintained. Awaiting bed placement.

## 2025-06-24 NOTE — H&P ADULT - PROBLEM SELECTOR PLAN 2
DVT PPx:  Diet: Regular  Family:   Dispo: Admitted to medicine DVT PPx: Ambulatory  Diet: Regular  Family: , updated on phone when rounding with attending  Dispo: Admitted to medicine Patient with anemia (chronic), leukopenia (with bandemia), and thrombocytopenia. May be 2/2 infection.    Plan:  - Continue to monitor  - If not improving, can consider heme eval

## 2025-06-24 NOTE — H&P ADULT - PROBLEM SELECTOR PLAN 1
Patient reporting fevers up to 102, associated with headache and rash.  - LP: increased protein, normal glucose    Plan:  - ID consult  - Given LP findings, can discontinue antibiotics  - Continue IV acyclovir   - Lactated Ringers 70cc/hr Patient reporting fevers up to 102, associated with headache and rash.  - LP: increased protein, normal glucose    Plan:  - ID consult  - HIV, syphilis, gonorrhea  - Lyme panel  - Hepatitis panel Patient reporting fevers up to 102, associated with headache and rash.  - RVP negative  - UA negative  - LP: neutrophils 4, increased protein, normal glucose, CSF PCR negative    Plan:  - ID consulted, appreciate recs  - HIV, syphilis, gonorrhea  - Lyme panel  - Hepatitis panel  - Motrin PRN for pain  - F/u blood cultures Patient reporting fevers up to 102, associated with headache and rash.  - RVP negative  - UA negative  - Leukopenia with bandemia and eosinophilia  - LP: neutrophils 4, increased protein, normal glucose, CSF PCR negative    Plan:  - ID consulted, appreciate recs  - HIV, syphilis, gonorrhea  - Lyme serology and CSF PCR  - Rickettsial serology  - Babesia PCR serum  - EBV serology  - [place] antibodies, serum  - Hepatitis panel  - Motrin PRN for pain  - If rash not improving, derm consult  - F/u blood cultures

## 2025-06-24 NOTE — CONSULT NOTE ADULT - ASSESSMENT
Impression/Hospital Course:  Patient is a 35yo female with PMH of sickle cell trait and anemia presenting with headache, fever, and rash since Saturday. Patient reports that predominant symptom was groin and buttocks pruritis during which she noticed a rash in the affected areas. Around that time also noticed a rash on the dorsum of the feet and proximal upper extremities. During that time developed a 7/10 headache that is relieved by Motrin. Admitted to intermittently having a fever of 101 and 102F at home. In the ED, patient afebrile BP 95/64 (normal per patient). Labs showed WBC 1.8 with 14.4% Monocytes, 7.2% Eosinophils and 19.3% BANDs, CRP 7.4, ESR 14, UA not indicative of infection and full RVP negative. Patient was imaged with CT head which was negative for acute findings and CXR showing clear lungs. An LP was performed on 6/24 showing 4 nucleated cells with 88% lymphocytes, 9444 RBCs, protein 196, and glucose 59 with CSF PCR negative.      Antimicrobials:  vancomycin 6/24  ceftriaxone 6/24  acyclovir 6/24    Assessment:  *Rash, subjective fevers, Leukopenia and thrombocytopenia unclear etiology, there is concern for tick disease particularly anaplasma/Ehrlichia considering leukopenia   *Leukopenia  *BANDemia  *Eosinophilia   *Thrombocytopenia   *Headache, CSF with mild pleocytosis (though using correction 1 WBC per 500 RBC patient is without pleocytosis), CSF PCR negative, not likely bacterial or fungal meningitis, unclear if there could be an aspect of viral or tick etc  *Mild elevation in CRP (7.4)    Recommendations: PLEASE DEFER ALL CHANGES IN PLAN UNTIL SIGNED BY ATTENDING. All recommendations are tentative pending Attending Attestation.  - Monitor off antibiotics for now  - Lyme serology  - would add lyme PCR to the CSF if possible   - Anaplasma/Ehrlichia PCR serum  - babesia PCR serum  - parasite smear  - HIV screen   - EBV serology   - add on [place] antibodies to serum as they were sent from the CSF  - CMV PCR serum   - trend temperature, PLTs, and WBC/BANDs/Eosinophils/Monocytes  - follow blood and CSF cultures    Javier Gutierrez DO, PGY-5   Infectious Disease Fellow  [employer] Teams Preferred  After 5pm/weekends call [phone]  Impression/Hospital Course:  Patient is a 33yo female with PMH of sickle cell trait and anemia presenting with headache, fever, and rash since Saturday. Patient reports that predominant symptom was groin and buttocks pruritis during which she noticed a rash in the affected areas. Around that time also noticed a rash on the dorsum of the feet and proximal upper extremities. During that time developed a 7/10 headache that is relieved by Motrin. Admitted to intermittently having a fever of 101 and 102F at home. In the ED, patient afebrile BP 95/64 (normal per patient). Labs showed WBC 1.8 with 14.4% Monocytes, 7.2% Eosinophils and 19.3% BANDs, CRP 7.4, ESR 14, UA not indicative of infection and full RVP negative. Patient was imaged with CT head which was negative for acute findings and CXR showing clear lungs. An LP was performed on 6/24 showing 4 nucleated cells with 88% lymphocytes, 9444 RBCs, protein 196, and glucose 59 with CSF PCR negative.      Antimicrobials:  vancomycin 6/24  ceftriaxone 6/24  acyclovir 6/24    Assessment:  *Rash, subjective fevers, Leukopenia and thrombocytopenia unclear etiology, there is concern for tick disease particularly anaplasma/Ehrlichia considering leukopenia   *Leukopenia  *BANDemia  *Eosinophilia   *Thrombocytopenia   *Headache, CSF with mild pleocytosis (though using correction 1 WBC per 500 RBC patient is without pleocytosis), CSF PCR negative, not likely bacterial or fungal meningitis, unclear if there could be an aspect of viral or tick etc  *Mild elevation in CRP (7.4)    Recommendations: PLEASE DEFER ALL CHANGES IN PLAN UNTIL SIGNED BY ATTENDING. All recommendations are tentative pending Attending Attestation.    - Lyme serology  - would add lyme PCR to the CSF if possible   - Anaplasma/Ehrlichia PCR serum  - babesia PCR serum  - parasite smear  - HIV screen   - EBV serology   - add on [place] antibodies to serum as they were sent from the CSF  - CMV PCR serum   - trend temperature, PLTs, and WBC/BANDs/Eosinophils/Monocytes  - follow blood and CSF cultures    Javier Gutierrez DO, PGY-5   Infectious Disease Fellow  [employer] Teams Preferred  After 5pm/weekends call [phone]  Impression/Hospital Course:  Patient is a 33yo female with PMH of sickle cell trait and anemia presenting with headache, fever, and rash since Saturday. Patient reports that predominant symptom was groin and buttocks pruritis during which she noticed a rash in the affected areas. Around that time also noticed a rash on the dorsum of the feet and proximal upper extremities. During that time developed a 7/10 headache that is relieved by Motrin. Admitted to intermittently having a fever of 101 and 102F at home. In the ED, patient afebrile BP 95/64 (normal per patient). Labs showed WBC 1.8 with 14.4% Monocytes, 7.2% Eosinophils and 19.3% BANDs, CRP 7.4, ESR 14, UA not indicative of infection and full RVP negative. Patient was imaged with CT head which was negative for acute findings and CXR showing clear lungs. An LP was performed on 6/24 showing 4 nucleated cells with 88% lymphocytes, 9444 RBCs, protein 196, and glucose 59 with CSF PCR negative.      Antimicrobials:  vancomycin 6/24  ceftriaxone 6/24  acyclovir 6/24    Assessment:  *Rash, subjective fevers, Leukopenia and thrombocytopenia unclear etiology, there is concern for tick disease particularly anaplasma/Ehrlichia considering leukopenia. Fevers started before the rash and also [place] so [place] may not be involved. Unclear if this could be rickettsial, swimmers itch (schistosoma) or swimmers eruption. Viral syndrome?    *Leukopenia  *BANDemia  *Eosinophilia   *Thrombocytopenia   *Headache, CSF with mild pleocytosis (though using correction 1 WBC per 500 RBC patient is without pleocytosis), CSF PCR negative, not likely bacterial or fungal meningitis, unclear if there could be an aspect of viral or tick etc  *Mild elevation in CRP (7.4)    Recommendations:   - start Doxycyline 100mg PO BID, incase this is rickettsial   - no need for ceftriaxone at this time   - Lyme serology  - would add lyme PCR to the CSF if possible   - Anaplasma/Ehrlichia PCR serum  - babesia PCR serum  - rickettsial serology   - HIV screen   - EBV serology   - add on [place] antibodies to serum as they were sent from the CSF  - trend temperature, PLTs, and WBC/BANDs/Eosinophils/Monocytes  - follow blood and CSF cultures    Javier Gutierrez DO, PGY-5   Infectious Disease Fellow  [employer] Teams Preferred  After 5pm/weekends call [phone]

## 2025-06-24 NOTE — PROCEDURAL SAFETY CHECKLIST WITH OR WITHOUT SEDATION - NSPRESEDATION2FT_GEN_ALL_CORE
[facility]   [facility]  [address] [address]  Dept Phone: [phone]    Landry Norman is a 89 year old male with a PMHx of HTN. HLD, prostate CA, severe AS s/p successful transfemoral elective TAVR on 3/13/2023.      He is here today with his son and daughter for his 1 year TAVR follow-up.  Denies any shortness of breath, chest pain, BLE edema.       Recently returned from a family wedding and reports walking frequently with no shortness of breath or fatigue.Reports he has some foot and leg pain to left leg for which he is following up with podiatry. Reports no loss of appetite or difficulty sleeping.   Recent follow-up CT completed and has follow-up appointment with Dr. Nelson to discuss results later this week.      SOCIAL HISTORY:     Social History     Tobacco Use    Smoking status: Former     Types: Cigars     Quit date: 2023     Years since quittin.8    Smokeless tobacco: Never    Tobacco comments:     now only smokes cigars, quit cigarettes 1976   Vaping Use    Vaping Use: never used   Substance Use Topics    Alcohol use: Yes     Comment: Rare    Drug use: No       PAST MEDICAL HX:     Past Medical History:   Diagnosis Date    Benign essential HTN     Dyslipidemia     Nonrheumatic aortic valve stenosis 2023    PAF (paroxysmal atrial fibrillation) (CMD)     in setting of acute pancreatitis    Pancreatitis     Prostate cancer (CMD)     s/p prostatectomy       ALLERGIES:   ALLERGIES:  Patient has no known allergies.    PAST SURGICAL HX:     Past Surgical History:   Procedure Laterality Date    Cardiac catherization      Laminectomy      Prostatectomy         Family HX:     Family History   Problem Relation Age of Onset    Patient is unaware of any medical problems Mother     Cancer Father         throat cancer       MEDICATIONS:     Current Outpatient Medications   Medication Sig Dispense Refill    lisinopril (ZESTRIL) 20  MG tablet Take 1 tablet by mouth daily. 90 tablet 3    mupirocin (BACTROBAN) 2 % ointment Apply to the affected nostril three times a day for ten days, then as needed when lesions present 30 g 1    hydroCHLOROthiazide (HYDRODIURIL) 12.5 MG tablet Take 1 tablet by mouth daily. No further refills until seen in office. 90 tablet 3    metoPROLOL succinate (TOPROL-XL) 25 MG 24 hr tablet Take 1 tablet by mouth daily. 90 tablet 3    aspirin 81 MG EC tablet Take 1 tablet by mouth daily.       No current facility-administered medications for this visit.       REVIEW OF SYSTEMS:   Review of Systems (except for as stated above in HPI )   No neurological complaints or hx of CVA/TIA.   No fevers or chills; unusual bleeding or bruising; no DVT hx.    No nausea/vomiting or significant GI/ complaints.   No musculoskeletal limitations except those stated above.   A ten point review of systems has been performed and is otherwise negative, except as described above.    PHYSICAL EXAM:     Visit Vitals  BP (!) 146/70 (BP Location: RUE - Right upper extremity, Patient Position: Sitting, Cuff Size: Regular)   Pulse 63   Resp 16   Wt 95.1 kg (209 lb 10.5 oz)   BMI 30.96 kg/m²        Wt Readings from Last 2 Encounters:   03/19/24 95.1 kg (209 lb 10.5 oz)   10/19/23 92.4 kg (203 lb 9.5 oz)       GENERAL: well developed, well nourished, in no apparent distress  NECK: no JVD  RESPIRATORY: clear to auscultation  CARDIOVASCULAR: S1, S2 normal, Regular rate and rhythm, no murmur  ABDOMEN:  soft, non-distended  EXTREMITIES:No BLE edema  DERM: Skin is warm & dry   PSYCHE: Cooperative with normal affect  NEURO:  Alert & oriented.    Diagnostic Imaging     TTE 4/18/2024    1. Left ventricle: The cavity size is normal. Wall thickness is mildly     increased. Systolic function is normal. The ejection fraction was measured     by 3D assessment. Wall motion is normal; there are no regional wall motion     abnormalities. Doppler parameters are  consistent with abnormal left     ventricular relaxation (grade 1 diastolic dysfunction). The ejection     fraction is 60%.  2. Aortic valve: There is no perivalular leak. The mean systolic gradient is     7mm Hg. The peak systolic gradient is 11mm Hg. There is a Evolut FX, 34 mm     transcatheter valve that is well seatedin the aortic valve postion.  3. Left atrium: The atrium is moderately dilated.  4. Right ventricle: The cavity size is normal. Wall thickness is normal.     Systolic function is normal.  5. Mitral valve: Leaflet separation is mildly reduced. The findings are     consistent with mild stenosis. There is mild regurgitation. The mean     diastolic gradient is 5mm Hg.  6. Pericardium, extracardiac: There is no pericardial effusion.  7. Normal sinus rhythm.  IMPRESSIONS:  The study shows improvement of TAVR mean gradient since the  study of 03/14/2023.    3/14/2023 TTE Post TAVR  1. Left ventricle: The cavity size is normal. Wall thickness is increased.     There is concentric hypertrophy. Systolic function is normal. The ejection     fraction was measured by visual estimation. The ejection fraction is 65%.  2. Aortic valve: There is a Evolut FX, 34 mm transcatheter valve that is well     seated in the aortic valve position. The peak aortic gradient is 20mmHg.     The mean aortic gradient is 11mmHg. There is no perivalular leak.  3. Left atrium: The atrium is mildly dilated.  4. Pericardium, extracardiac: There is no pericardial effusion.    R/[facility] 1/25/2023 Alice Patel   [facility]: Mild to moderate CAD with severe aortic stenosis.    RA 5 mmHg  RV 36/8  PA 36/14 with a mean of 22  Wedge 16  /21  Ao 104/51   CO 5.2 L/min  CI 2.6 L/min/m²    Aortic valve measurement  Mean gradient 48 mmHg  Aortic valve area: Jean Marie 0.8 cm², TD 0.7 cm²     EKG:     Encounter Date: 03/19/24   Electrocardiogram 12-Lead   Result Value    Ventricular Rate EKG/Min (BPM) 63    Atrial Rate (BPM) 63    HI-Interval (MSEC) 248     QRS-Interval (MSEC) 120    QT-Interval (MSEC) 434    QTc 444    P Axis (Degrees) 72    R Axis (Degrees) -75    T Axis (Degrees) 88    REPORT TEXT      Sinus rhythm  with 1st degree AV block  Left axis deviation  Left ventricular hypertrophy  with QRS widening and repolarization abnormality  (  [place] product  )  Anteroseptal infarct  (cited on or before  07-FEB-2023)  Abnormal ECG  When compared with ECG of  18-APR-2023 12:10,  T wave inversion more evident in  Lateral leads  Confirmed by ORSANA VAZQUEZ M.D. ([id number]) on 3/19/2024 1:37:08 PM       Reviewed last cardiology note Reviewed last cardiac labs  Reviewed hospital testing     Patient Active Problem List   Diagnosis    PAF (paroxysmal atrial fibrillation) (CMD)    Benign essential HTN    Dyslipidemia    Status post total left knee replacement    Personal history of malignant neoplasm of prostate    History of pancreatitis    Severe aortic stenosis    Left renal mass    S/P TAVR (transcatheter aortic valve replacement) February 2023    Lesion of left lung    Alveolar exostosis    Coronary artery disease involving native coronary artery of native heart without angina pectoris    PVC's (premature ventricular contractions)    Stage 3a chronic kidney disease (CMD)       ASSESSMENT/PLAN     Severe, symptomatic Aortic Stenosis   NYHA class II symptoms  - s/p successful transfemoral GERBER with 34 mm Medtronic Evolut FX 3/13/2023  - Echo 4/2023  with aortic MG 7mmHg and no PVL  - cont aspirin 81 mg daily   - lifelong dental Abx prophylaxis - script at pharmacy  - one year ECHO today    CAD  - [facility] 1/25/2023 by Dr. Patel at [facility] with moderate non-obstructive CAD.    - stable  - cont aspirin 81 mg daily     HTN  - controlled  - cont lisinopril, hydrochlorothiazide, metoprolol    Brief PAF in 2013 with no recurrance  - cont aspirin    PVCs  Bradycardia  14 day EM February 2023 stable  - cont low dose BB  - stable    CKD  - baseline GFR around 50-55  - stable on labs  from 3/18    Left renal mass   Lingula mass  - Currently following with Dr. Nelson, Heme/Oncology.  Following closely with serial CT/PET imaging.  Most recent imaging 3/18/2024 with a ALICIA/RUL lung lesion suspicious for malignancy.  - plan for follow-up on 3/21 to discuss recent imaging findings    Thyroid mass/nodules  - Conservative management with plan for follow up in 6 months with Dr. Collado  - incidentally found on TAVR CTA      SARWAT Goncalves  [facility] Advanced Practice Nurse   3/19/2024  2:29 PM   Care of the patient is governed by the Department of Anesthesia policy and procedure manual.

## 2025-06-24 NOTE — CONSULT NOTE ADULT - SUBJECTIVE AND OBJECTIVE BOX
Patient is a 34y old  Female who presents with a chief complaint of headache fever    HPI:  Patient is a 33yo female with PMH of sickle cell trait and anemia presenting with headache, fever, and rash since Saturday. Patient reports that predominant symptom was groin and buttocks pruritis during which she noticed a rash in the affected areas. Around that time also noticed a rash on the dorsum of the feet and proximal upper extremities. During that time developed a 7/10 headache that is relieved by Motrin. Admitted to intermittently having a fever of 101 and 102F at home. In the ED, patient afebrile BP 95/64 (normal per patient). Labs showed WBC 1.8 with 14.4% Monocytes, 7.2% Eosinophils and 19.3% BANDs, CRP 7.4, ESR 14, UA not indicative of infection and full RVP negative. Patient was imaged with CT head which was negative for acute findings and CXR showing clear lungs. An LP was performed on  showing 4 nucleated cells with 88% lymphocytes, 9444 RBCs, protein 196, and glucose 59 with CSF PCR negative.      REVIEW OF SYSTEMS  pending full examination    prior hospital charts reviewed [V]  primary team notes reviewed [V]  other consultant notes reviewed [V]    PAST MEDICAL & SURGICAL HISTORY:  Cutaneous abscess, unspecified      Disorder of the skin and subcutaneous tissue, unspecified      Sickle cell trait      History of anemia      H/O  section  2019          SOCIAL HISTORY:  Denied smoking/vaping/alcohol/recreational drug use    FAMILY HISTORY:      Allergies  No Known Allergies        ANTIMICROBIALS:      ANTIMICROBIALS (past 90 days):  MEDICATIONS  (STANDING):  acyclovir IVPB   261 mL/Hr IV Intermittent (25 @ 08:35)    cefTRIAXone   IVPB   100 mL/Hr IV Intermittent (25 @ 05:31)    vancomycin  IVPB.   250 mL/Hr IV Intermittent (25 @ 06:36)        OTHER MEDS:   MEDICATIONS  (STANDING):  ibuprofen  Tablet. 200 every 6 hours PRN      VITALS:  Vital Signs Last 24 Hrs  T(F): 98.9 (25 @ 08:55), Max: 99.7 (25 @ 00:13)    Vital Signs Last 24 Hrs  HR: 89 (25 @ 08:55) (81 - 92)  BP: 96/63 (25 @ 08:55) (95/64 - 101/62)  RR: 18 (25 @ 08:55)  SpO2: 99% (25 @ 08:55) (98% - 99%)  Wt(kg): --    EXAM:  pending full examination      Labs:                        11.3   1.80  )-----------( 146      ( 2025 00:08 )             34.5         135  |  100  |  17  ----------------------------<  91  3.8   |  24  |  0.77    Ca    9.1      2025 00:08    TPro  8.0  /  Alb  4.5  /  TBili  <0.2  /  DBili  x   /  AST  20  /  ALT  12  /  AlkPhos  47        WBC Trend:  WBC Count: 1.80 (25 @ 00:08)      Auto Neutrophil #: 1.03 K/uL (25 @ 00:08)      Creatine Trend:  Creatinine: 0.77 ()      Liver Biochemical Testing Trend:  Alanine Aminotransferase (ALT/SGPT): 12 ()  Aspartate Aminotransferase (AST/SGOT): 20 (25 @ 00:08)  Bilirubin Total: <0.2 ()    Auto Eosinophil %: 7.2 % (25 @ 00:08)    MICROBIOLOGY:  Culture - CSF with Gram Stain (collected 2025 04:40)  Source: CSF CSF    Rapid RVP Result: NotDetec ( @ 00:08)    C-Reactive Protein: 7.4 ()    Blood Gas Venous - Lactate: 1.0 ( @ 00:08)    RADIOLOGY:  < from: CT Head No Cont (25 @ 08:52) >  IMPRESSION: No evidence of an acutetranscortical infarction or   hemorrhage.    < from: Xray Chest 2 Views PA/Lat (25 @ 00:22) >  IMPRESSION:  Clear lungs.         Patient is a 34y old  Female who presents with a chief complaint of headache fever    HPI:  Patient is a 35yo female with PMH of sickle cell trait and anemia presenting with headache, fever, and rash since Saturday. Patient reports that predominant symptom was groin and buttocks pruritis during which she noticed a rash in the affected areas. Around that time also noticed a rash on the dorsum of the feet and proximal upper extremities. During that time developed a 7/10 headache that is relieved by Motrin. Admitted to intermittently having a fever of 101 and 102F at home. In the ED, patient afebrile BP 95/64 (normal per patient). Labs showed WBC 1.8 with 14.4% Monocytes, 7.2% Eosinophils and 19.3% BANDs, CRP 7.4, ESR 14, UA not indicative of infection and full RVP negative. Patient was imaged with CT head which was negative for acute findings and CXR showing clear lungs. An LP was performed on  showing 4 nucleated cells with 88% lymphocytes, 9444 RBCs, protein 196, and glucose 59 with CSF PCR negative.  Patient states that she started to feel sick with fevers before she had the rash. The fevers started on  and she didnt begin to have the rash until . She also states she recently went to a lake in [place] and sat on the sand but didn't fully submerge herself. She denies any chest pain, sob, neck pain or confusion. denies issues with bowel movements or urination     REVIEW OF SYSTEMS  Constitutional: No fevers, No chills, No weight loss, positive fatigue   Skin: positive pruritic rash, no phlebitis	  Eyes: No discharge, No change in vision	  ENMT: No sore throat, No ulcers  Respiratory: No cough, no SOB  Cardiovascular:  No chest pain, No palpitations   Gastrointestinal: No pain, No nausea, No vomiting, No diarrhea, No constipation	  Genitourinary: No dysuria, No frequency, No hesitancy, No flank pain  MSK: positive generalized aches, No edema  Neurological: No HA, no weakness, no seizures, no AMS     prior hospital charts reviewed [V]  primary team notes reviewed [V]  other consultant notes reviewed [V]    PAST MEDICAL & SURGICAL HISTORY:  Cutaneous abscess, unspecified      Disorder of the skin and subcutaneous tissue, unspecified      Sickle cell trait      History of anemia      H/O  section  2019          SOCIAL HISTORY:  Denied smoking/vaping/alcohol/recreational drug use, Born in [place] came [facility] in , last travel  to [place] and  to , lives with her  and two children, worksas a dental assistant, went to a lake in [facility] on , no sick contacts, nobody else from the lake has any issues     FAMILY HISTORY:      Allergies  No Known Allergies        ANTIMICROBIALS:      ANTIMICROBIALS (past 90 days):  MEDICATIONS  (STANDING):  acyclovir IVPB   261 mL/Hr IV Intermittent (25 @ 08:35)    cefTRIAXone   IVPB   100 mL/Hr IV Intermittent (25 @ 05:31)    vancomycin  IVPB.   250 mL/Hr IV Intermittent (25 @ 06:36)        OTHER MEDS:   MEDICATIONS  (STANDING):  ibuprofen  Tablet. 200 every 6 hours PRN      VITALS:  Vital Signs Last 24 Hrs  T(F): 98.9 (25 @ 08:55), Max: 99.7 (25 @ 00:13)    Vital Signs Last 24 Hrs  HR: 89 (25 @ 08:55) (81 - 92)  BP: 96/63 (25 @ 08:55) (95/64 - 101/62)  RR: 18 (25 @ 08:55)  SpO2: 99% (25 @ 08:55) (98% - 99%)  Wt(kg): --    EXAM:  General: Patient appears comfortable, no acute distress  HEENT: NCAT, PERRL, anicteric sclera, mucous membranes moist and intact  Neck: Supple, No lymphadenopathy  CV: +S1/S2, RRR, no M/R/G  Lungs: No respiratory distress, CTA b/l, no wheezing, rales or rhonchi  Abd:  BS4+, Soft, NTND, no guarding  : No suprapubic tenderness  Neuro: AAOx3. No focal deficits noted.   Ext: No cyanosis, no edema  Msk: freely moving upper and lower extremities  Skin: petechial rash on the dorsal aspect of the R foot, in the inguinal area on the L and on the gluteal area b/l, rash worse on the gluteal area on the L where the petechia are coalescing, no phlebitis, No erythema       Labs:                        11.3   1.80  )-----------( 146      ( 2025 00:08 )             34.5         135  |  100  |  17  ----------------------------<  91  3.8   |  24  |  0.77    Ca    9.1      2025 00:08    TPro  8.0  /  Alb  4.5  /  TBili  <0.2  /  DBili  x   /  AST  20  /  ALT  12  /  AlkPhos  47        WBC Trend:  WBC Count: 1.80 (25 @ 00:08)      Auto Neutrophil #: 1.03 K/uL (25 @ 00:08)      Creatine Trend:  Creatinine: 0.77 ()      Liver Biochemical Testing Trend:  Alanine Aminotransferase (ALT/SGPT): 12 ()  Aspartate Aminotransferase (AST/SGOT): 20 (25 @ 00:08)  Bilirubin Total: <0.2 ()    Auto Eosinophil %: 7.2 % (25 @ 00:08)    MICROBIOLOGY:  Culture - CSF with Gram Stain (collected 2025 04:40)  Source: CSF CSF    Rapid RVP Result: NotDetec ( @ 00:08)    C-Reactive Protein: 7.4 ()    Blood Gas Venous - Lactate: 1.0 ( @ 00:08)    RADIOLOGY:  < from: CT Head No Cont (25 @ 08:52) >  IMPRESSION: No evidence of an acutetranscortical infarction or   hemorrhage.    < from: Xray Chest 2 Views PA/Lat (25 @ 00:22) >  IMPRESSION:  Clear lungs.

## 2025-06-24 NOTE — H&P ADULT - NSHPREVIEWOFSYSTEMS_GEN_ALL_CORE
REVIEW OF SYSTEMS:    CONSTITUTIONAL: No weakness, fevers or chills  EYES/ENT: No visual changes;  No vertigo or throat pain   NECK: No pain or stiffness  RESPIRATORY: No cough, wheezing, hemoptysis; No shortness of breath  CARDIOVASCULAR: No chest pain or palpitations  GASTROINTESTINAL: No abdominal or epigastric pain. No nausea, vomiting, or hematemesis; No diarrhea or constipation. No melena or hematochezia.  GENITOURINARY: No dysuria, frequency or hematuria  NEUROLOGICAL: No numbness or weakness  SKIN: No itching, rashes REVIEW OF SYSTEMS:    CONSTITUTIONAL: + fever  EYES/ENT: No sore throat  NECK: No pain or stiffness  RESPIRATORY: No cough; No shortness of breath  CARDIOVASCULAR: No chest pain  GASTROINTESTINAL: +lower abdominal pain (chronic). No nausea, vomiting; No diarrhea or constipation  GENITOURINARY: No dysuria, frequency  NEUROLOGICAL: No numbness or weakness  SKIN: + itching, + rashes

## 2025-06-24 NOTE — H&P ADULT - NSHPSOCIALHISTORY_GEN_ALL_CORE
Lives at home with  and 2 year-old child (not in school yet), and 5 year-old child (in 1st grade) Lives at home with  and 3 year-old child (not in school yet), and 5 year-old child (in 1st grade)

## 2025-06-24 NOTE — H&P ADULT - ATTENDING COMMENTS
34W w/ SS trait presenting with fever, headache, rash, and arthralgias since Saturday found to have leukopenia/neutropenia, thrombocytopenia, LP with CSF with elevated protein and without pleocytosis and negative meningitis PCR overall c/f tickborne illness vs viral infection.    On evaluation she continues to have headache without neck stiffness or photophobia. Arthralgias have resolved. Rash is pruritic, distributed in groin/buttocks and over feet, non-blanching (?petechial).      Recent exposure to lake in [place], but symptoms started prior. No other recent travel. No sick contacts. No pets. No known tick or mosquito bites.    - ID consulted  - Check HIV  - F/up blood and CSF cultures  - F/up CSF lyme PCR   - Lyme serologies, anaplasma/ehrlichia serum PCR  - Rickettsial serology  - EBV serology  - Serum WNV serology  - Doxycycline 100 mg PO BID  - Consider hematology and/or dermatology consult pending CBC trend and rash progression    Seen with     Time-based billing (NON-critical care).   75 minutes spent on total encounter, which excludes teaching and separately reported services. The necessity of the time spent during the encounter on this date of service was due to:   Documentation in [place], reviewing chart and coordinating care with patient/resident and interdisciplinary staff (such as , social workers, etc) as well as reviewing vitals, laboratory data, radiology, medication list, consultants' recommendations and prior records. Interventions were performed as documented above.

## 2025-06-24 NOTE — CONSULT NOTE ADULT - ATTENDING COMMENTS
34 year old presented with body aches with rash. Associated subjective fever  [place]    Now with skin eruption that is pruritic.  She notes associated headache and subjective fever    She has been afebrile since presentation  Has leukopenia with eosinophilia    S/p LP without significant pleocytosis.   Had Ceftriaxone at 5 am     Not toxic appearing    Sea bather's eruption and Swimmer's itch are on the differential    Can get cytopenia and eosinophilia with Swimmer's itch    Follow CBC with differential  Check HIV status  Can check rickettsial serology   Can check tick serology  Check EBV serology  Check Serum [place] Nile Serology  Follow up blood cultures-     Can give doxycyline 100 mg po q 12 pending repeat cbc    If rash is not improving, derm eval  If cytopenia are not improving, heme eval 34 year old presented with body aches with rash. Associated subjective fever  [place]    Now with skin eruption that is pruritic.  She notes associated headache and subjective fever    She has been afebrile since presentation  Has leukopenia with eosinophilia    S/p LP without significant pleocytosis.   Had Ceftriaxone at 5 am     Not toxic appearing    Sea bather's eruption and Swimmer's itch are on the differential  Can get cytopenia and eosinophilia with Swimmer's itch    But patient did clarify that she had fever on Saturday and [place]    ? viral syndrome vs tick borne illness (less likely as she has not travelled in recent weeks prior to [place] day trip/ fver started before she went to [place]) vs autoimmune process vs heme process    Follow CBC with differential  Check HIV status  Can check rickettsial serology   Can check tick serology  Check EBV serology  Check Serum West Nile Serology  Follow up blood cultures-     Had Ceftriaxone at 5 am- we will have prelim blood culture results prior to 6/25 at 5 am - can hold cefriaxone  Can give doxycyline 100 mg po q 12 pending repeat cbc    If rash is not improving, derm eval  If cytopenia are not improving, heme eval 34 year old presented with body aches with rash. Associated subjective fever  [place]    Now with skin eruption that is pruritic.  She notes associated headache and subjective fever    She has been afebrile since presentation  Has leukopenia with eosinophilia    S/p LP without significant pleocytosis.   Had Ceftriaxone at 5 am     Not toxic appearing    Sea bather's eruption and Swimmer's itch are on the differential  Can get cytopenia and eosinophilia with Swimmer's itch    But patient did clarify that she had fever on Saturday and [place]    ? viral syndrome([place], [place])  vs tick borne illness (less likely as she has not travelled in recent weeks prior to [place] day trip/ fver started before she went to [place]) vs autoimmune process vs heme process    Follow CBC with differential  Check HIV status  Can check rickettsial serology   Can check tick serology  Check EBV serology  Check Serum West Nile Serology  Follow up blood cultures-     Had Ceftriaxone at 5 am- we will have prelim blood culture results prior to 6/25 at 5 am - can hold ceftriaxone  Can give doxycyline 100 mg po q 12 pending repeat cbc    If rash is not improving, derm eval  If cytopenia are not improving, heme eval

## 2025-06-24 NOTE — H&P ADULT - PROBLEM SELECTOR PLAN 3
DVT PPx: Ambulatory  Diet: Regular  Family: , updated on phone when rounding with attending  Dispo: Admitted to medicine

## 2025-06-24 NOTE — H&P ADULT - ASSESSMENT
Patient is a 35yo female with anemia and sickle cell trait presenting with petechial rash, fever, and headache. Although LP is bloody, history and CT less concerning for SAH. Petechial rash may be concerning for meningococcal septicemia, paxton mountain spotted fever, or disseminated gonococcal infection, but patient overall well-appearing. Viral meningitis is a possibility given clinical picture and LP findings. Patient is a 35yo female with anemia and sickle cell trait presenting with petechial rash, fever, and headache. Although LP is bloody, history and CT less concerning for SAH. Petechial rash may be concerning for meningococcal septicemia, paxton mountain spotted fever, or disseminated gonococcal infection, but patient overall well-appearing. However, CSF not consistent with meningitis. Clinical picture more concerning for other viral illness.  Patient is a 35yo female with anemia and sickle cell trait presenting with petechial rash, fever, and headache. Although LP is bloody, history and CT less concerning for SAH. Petechial rash may be concerning for meningococcal septicemia, paxton mountain spotted fever, or disseminated gonococcal infection, but patient overall well-appearing. However, CSF not consistent with meningitis. Clinical picture more concerning for other viral or tick-borne illness.

## 2025-06-25 LAB
ADD ON TEST-SPECIMEN IN LAB: SIGNIFICANT CHANGE UP
ALBUMIN SERPL ELPH-MCNC: 3.9 G/DL — SIGNIFICANT CHANGE UP (ref 3.3–5)
ALP SERPL-CCNC: 38 U/L — LOW (ref 40–120)
ALT FLD-CCNC: 13 U/L — SIGNIFICANT CHANGE UP (ref 4–33)
ANION GAP SERPL CALC-SCNC: 11 MMOL/L — SIGNIFICANT CHANGE UP (ref 7–14)
AST SERPL-CCNC: 17 U/L — SIGNIFICANT CHANGE UP (ref 4–32)
B BURGDOR C6 AB SER-ACNC: NEGATIVE — SIGNIFICANT CHANGE UP
B BURGDOR IGG+IGM SER-ACNC: 0.28 INDEX — SIGNIFICANT CHANGE UP (ref 0.01–0.9)
B MICROTI DNA BLD QL NAA+PROBE: SIGNIFICANT CHANGE UP
BABESIA 18S RRNA BLD QL NAA+PROBE: SIGNIFICANT CHANGE UP
BASOPHILS # BLD AUTO: 0.01 K/UL — SIGNIFICANT CHANGE UP (ref 0–0.2)
BASOPHILS NFR BLD AUTO: 0.6 % — SIGNIFICANT CHANGE UP (ref 0–2)
BILIRUB SERPL-MCNC: <0.2 MG/DL — SIGNIFICANT CHANGE UP (ref 0.2–1.2)
BUN SERPL-MCNC: 8 MG/DL — SIGNIFICANT CHANGE UP (ref 7–23)
CALCIUM SERPL-MCNC: 8.8 MG/DL — SIGNIFICANT CHANGE UP (ref 8.4–10.5)
CHLORIDE SERPL-SCNC: 106 MMOL/L — SIGNIFICANT CHANGE UP (ref 98–107)
CO2 SERPL-SCNC: 23 MMOL/L — SIGNIFICANT CHANGE UP (ref 22–31)
CREAT SERPL-MCNC: 0.75 MG/DL — SIGNIFICANT CHANGE UP (ref 0.5–1.3)
CULTURE RESULTS: SIGNIFICANT CHANGE UP
EBV EA AB SER IA-ACNC: <5 U/ML — SIGNIFICANT CHANGE UP
EBV EA AB TITR SER IF: POSITIVE
EBV EA IGG SER-ACNC: NEGATIVE — SIGNIFICANT CHANGE UP
EBV NA IGG SER IA-ACNC: 241 U/ML — HIGH
EBV PATRN SPEC IB-IMP: SIGNIFICANT CHANGE UP
EBV PCR: SIGNIFICANT CHANGE UP IU/ML
EBV VCA IGG AVIDITY SER QL IA: POSITIVE
EBV VCA IGM SER IA-ACNC: 13.3 U/ML — SIGNIFICANT CHANGE UP
EBV VCA IGM SER IA-ACNC: 97.3 U/ML — HIGH
EBV VCA IGM TITR FLD: NEGATIVE — SIGNIFICANT CHANGE UP
EGFR: 108 ML/MIN/1.73M2 — SIGNIFICANT CHANGE UP
EGFR: 108 ML/MIN/1.73M2 — SIGNIFICANT CHANGE UP
EOSINOPHIL # BLD AUTO: 0.16 K/UL — SIGNIFICANT CHANGE UP (ref 0–0.5)
EOSINOPHIL NFR BLD AUTO: 9.5 % — HIGH (ref 0–6)
GLUCOSE SERPL-MCNC: 92 MG/DL — SIGNIFICANT CHANGE UP (ref 70–99)
HAV IGM SER-ACNC: SIGNIFICANT CHANGE UP
HBV CORE IGM SER-ACNC: SIGNIFICANT CHANGE UP
HBV SURFACE AG SER-ACNC: SIGNIFICANT CHANGE UP
HCT VFR BLD CALC: 35.1 % — SIGNIFICANT CHANGE UP (ref 34.5–45)
HCV AB S/CO SERPL IA: 0.16 S/CO — SIGNIFICANT CHANGE UP (ref 0–0.79)
HCV AB SERPL-IMP: SIGNIFICANT CHANGE UP
HGB BLD-MCNC: 11.5 G/DL — SIGNIFICANT CHANGE UP (ref 11.5–15.5)
HIV 1+2 AB+HIV1 P24 AG SERPL QL IA: SIGNIFICANT CHANGE UP
IMM GRANULOCYTES # BLD AUTO: 0.01 K/UL — SIGNIFICANT CHANGE UP (ref 0–0.07)
IMM GRANULOCYTES NFR BLD AUTO: 0.6 % — SIGNIFICANT CHANGE UP (ref 0–0.9)
LYMPHOCYTES # BLD AUTO: 0.78 K/UL — LOW (ref 1–3.3)
LYMPHOCYTES NFR BLD AUTO: 46.2 % — HIGH (ref 13–44)
MAGNESIUM SERPL-MCNC: 1.9 MG/DL — SIGNIFICANT CHANGE UP (ref 1.6–2.6)
MCHC RBC-ENTMCNC: 27.1 PG — SIGNIFICANT CHANGE UP (ref 27–34)
MCHC RBC-ENTMCNC: 32.8 G/DL — SIGNIFICANT CHANGE UP (ref 32–36)
MCV RBC AUTO: 82.6 FL — SIGNIFICANT CHANGE UP (ref 80–100)
MONOCYTES # BLD AUTO: 0.25 K/UL — SIGNIFICANT CHANGE UP (ref 0–0.9)
MONOCYTES NFR BLD AUTO: 14.8 % — HIGH (ref 2–14)
NEUTROPHILS # BLD AUTO: 0.48 K/UL — LOW (ref 1.8–7.4)
NEUTROPHILS NFR BLD AUTO: 28.3 % — LOW (ref 43–77)
NRBC # BLD AUTO: 0 K/UL — SIGNIFICANT CHANGE UP (ref 0–0)
NRBC # FLD: 0 K/UL — SIGNIFICANT CHANGE UP (ref 0–0)
NRBC BLD AUTO-RTO: 0 /100 WBCS — SIGNIFICANT CHANGE UP (ref 0–0)
PHOSPHATE SERPL-MCNC: 3.3 MG/DL — SIGNIFICANT CHANGE UP (ref 2.5–4.5)
PLATELET # BLD AUTO: 129 K/UL — LOW (ref 150–400)
PMV BLD: 10.9 FL — SIGNIFICANT CHANGE UP (ref 7–13)
POTASSIUM SERPL-MCNC: 3.4 MMOL/L — LOW (ref 3.5–5.3)
POTASSIUM SERPL-SCNC: 3.4 MMOL/L — LOW (ref 3.5–5.3)
PROT SERPL-MCNC: 6.9 G/DL — SIGNIFICANT CHANGE UP (ref 6–8.3)
RBC # BLD: 4.25 M/UL — SIGNIFICANT CHANGE UP (ref 3.8–5.2)
RBC # FLD: 12.4 % — SIGNIFICANT CHANGE UP (ref 10.3–14.5)
SODIUM SERPL-SCNC: 140 MMOL/L — SIGNIFICANT CHANGE UP (ref 135–145)
SPECIMEN SOURCE: SIGNIFICANT CHANGE UP
T PALLIDUM AB TITR SER: NEGATIVE — SIGNIFICANT CHANGE UP
WBC # BLD: 1.69 K/UL — LOW (ref 3.8–10.5)
WBC # FLD AUTO: 1.69 K/UL — LOW (ref 3.8–10.5)

## 2025-06-25 PROCEDURE — G0545: CPT

## 2025-06-25 PROCEDURE — 99232 SBSQ HOSP IP/OBS MODERATE 35: CPT | Mod: GC

## 2025-06-25 PROCEDURE — 99223 1ST HOSP IP/OBS HIGH 75: CPT

## 2025-06-25 PROCEDURE — 99232 SBSQ HOSP IP/OBS MODERATE 35: CPT

## 2025-06-25 RX ORDER — ACETAMINOPHEN 500 MG/5ML
650 LIQUID (ML) ORAL EVERY 6 HOURS
Refills: 0 | Status: DISCONTINUED | OUTPATIENT
Start: 2025-06-25 | End: 2025-06-27

## 2025-06-25 RX ORDER — ONDANSETRON HCL/PF 4 MG/2 ML
4 VIAL (ML) INJECTION ONCE
Refills: 0 | Status: COMPLETED | OUTPATIENT
Start: 2025-06-25 | End: 2025-06-25

## 2025-06-25 RX ORDER — IBUPROFEN 200 MG
400 TABLET ORAL EVERY 6 HOURS
Refills: 0 | Status: DISCONTINUED | OUTPATIENT
Start: 2025-06-25 | End: 2025-06-27

## 2025-06-25 RX ORDER — MAGNESIUM SULFATE 500 MG/ML
2 SYRINGE (ML) INJECTION ONCE
Refills: 0 | Status: DISCONTINUED | OUTPATIENT
Start: 2025-06-25 | End: 2025-06-25

## 2025-06-25 RX ORDER — ACETAMINOPHEN 500 MG/5ML
650 LIQUID (ML) ORAL ONCE
Refills: 0 | Status: COMPLETED | OUTPATIENT
Start: 2025-06-25 | End: 2025-06-25

## 2025-06-25 RX ADMIN — Medication 1 APPLICATION(S): at 11:17

## 2025-06-25 RX ADMIN — Medication 650 MILLIGRAM(S): at 12:54

## 2025-06-25 RX ADMIN — Medication 100 MILLIGRAM(S): at 17:38

## 2025-06-25 RX ADMIN — Medication 650 MILLIGRAM(S): at 11:54

## 2025-06-25 RX ADMIN — Medication 4 MILLIGRAM(S): at 08:20

## 2025-06-25 RX ADMIN — Medication 40 MILLIEQUIVALENT(S): at 21:00

## 2025-06-25 RX ADMIN — Medication 650 MILLIGRAM(S): at 08:20

## 2025-06-25 RX ADMIN — Medication 100 MILLIGRAM(S): at 05:49

## 2025-06-25 RX ADMIN — Medication 200 MILLIGRAM(S): at 05:49

## 2025-06-25 RX ADMIN — Medication 650 MILLIGRAM(S): at 09:20

## 2025-06-25 NOTE — PROGRESS NOTE ADULT - ASSESSMENT
34 year old presented with body aches with rash. Associated subjective fever  [place]  Of note , pt was febrile on Saturday prior to lake exposure    Skin eruption that is pruritic.  She notes associated headache and subjective fever    Has leukopenia with elevated eosinophilia % (but not absolute #)    Sea bather's eruption and Swimmer's itch are on the differential but less likely. I suspect fever and petechial rash are part of the same process and fever started before lake exposure.     ? viral syndrome([place], [place])  vs tick borne illness (less likely as she has not travelled in recent weeks prior to [place] day trip/ fever started before she went to [place]) vs autoimmune process vs heme process    HIV negative  RVP negative    Follow CBC with differential    Can check rickettsial serology   Can check tick serology  Check EBV serology  Check Serum West Nile Serology  Follow up blood cultures- NGTD        Continue doxycyline 100 mg po q 12 for now    If rash is not improving, derm eval  If cytopenia are not improving, heme eval

## 2025-06-25 NOTE — CONSULT NOTE ADULT - ASSESSMENT
R34F with anemia and sickle cell trait presenting with petechial rash, fever, and headache, found to have new pancytopenia. Hematology consulted for pancytopenia.    #Leukopenia  #Thrombocytopenia  - Admission CBC (6/24/25): WBC=1.80, TJF=1292, hgb=11.3, nii=653. Baselinewbc, hgb and plt all within normal limits in May 2025)  - Appreciate ID recs, infectious workup underway, empirically started Doxycycline 100mg PO q12h   - s/p LP (6/24/25): bloody, otherwise CSF studies WNL  - peripheral blood smear reviewed (6/25/25): RBCs normocytic and normochromic, no anisocytosis or poikilocytosis. WBCs few in number, demonstrate normal maturation, no dyspastic appearing WBCs seen. Platelets are mildly decreased in number, few large platelets and without clumping  - Based on clinical presentation and acuity of pancytopenia, suspect pancytopenia is in the setting of recent infectious illness vs. autoimmune condition vs. less likely primary bone marrow disorder    Recommend:  - Agree with infectious workup and treatment per primary team and ID  - Anticipate the cytopenias will improve with treatment of underlying process (infectious)  - If blood counts continue to worsen despite infectious treatment, will re-assess utility in further workup such as bone marrow biopsy.     Patient seen and evaluated with Dr. Charles Crow, PGY-5  Fellow Hematology/Oncology  pager [phone]  Available on TEAMS  After 5pm or on weekends please contact  to page on-call fellow   R34F with anemia and sickle cell trait presenting with petechial rash, fever, and headache, found to have new pancytopenia. Hematology consulted for pancytopenia.    #Leukopenia  #Thrombocytopenia  - Admission CBC (6/24/25): WBC=1.80, LWQ=7954, hgb=11.3, tml=856. Baseline wbc, hgb and plt are all within normal limits on 5/15/2025)  - Appreciate ID recs, infectious workup underway, empirically started Doxycycline 100mg PO q12h   - s/p LP (6/24/25): bloody, otherwise CSF studies WNL  - peripheral blood smear reviewed (6/25/25): RBCs normocytic and normochromic, no anisocytosis or poikilocytosis. WBCs few in number, demonstrate normal maturation, no dyspastic appearing WBCs seen. Platelets are mildly decreased in number, few large platelets and without clumping  - Based on clinical presentation and acuity of pancytopenia, suspect pancytopenia is in the setting of recent/ongoing infectious etiology vs. autoimmune condition given rash vs. less likely primary bone marrow disorder    Recommend:  - Agree with infectious workup and treatment per primary team and ID  - Anticipate the cytopenias will improve with treatment of underlying process (suspect infectious)  - Please check daily CBC w/ diff to monitor ANC. No indication to give Zarxio at this time.   - If blood counts continue to worsen despite infectious treatment, will re-assess utility of pursuing further workup such as bone marrow biopsy.     Patient seen and evaluated with Dr. Charles Crow, PGY-5  Fellow Hematology/Oncology  pager [phone]  Available on TEAMS  After 5pm or on weekends please contact  to page on-call fellow   R34F with anemia and sickle cell trait presenting with petechial rash, fever, and headache, found to have new pancytopenia. Hematology consulted for pancytopenia.    #Leukopenia  #Thrombocytopenia  - Admission CBC (6/24/25): WBC=1.80, AEJ=4802, hgb=11.3, rno=050. Baseline wbc, hgb and plt are all within normal limits on 5/15/2025)  - Appreciate ID recs, infectious workup underway, empirically started Doxycycline 100mg PO q12h   - s/p LP (6/24/25): bloody, otherwise CSF studies WNL  - peripheral blood smear reviewed (6/25/25): RBCs normocytic and normochromic, no anisocytosis or poikilocytosis. WBCs few in number, demonstrate normal maturation, no dysplastic appearing WBCs seen. Platelets are mildly decreased in number, few large platelets and without clumping  - Based on clinical presentation and acuity of pancytopenia, suspect pancytopenia is in the setting of recent/ongoing infectious etiology vs. autoimmune condition given rash vs. less likely primary bone marrow disorder    Recommend:  - Agree with infectious workup and treatment per primary team and ID  - Anticipate the cytopenias will improve with treatment of underlying process (suspect infectious)  - Please check daily CBC w/ diff to monitor ANC. No indication to give Zarxio at this time.   - If blood counts continue to worsen despite infectious treatment, will re-assess utility of pursuing further workup such as bone marrow biopsy.     Patient seen and evaluated with Dr. Charels Crow, PGY-5  Fellow Hematology/Oncology  pager [phone]  Available on TEAMS  After 5pm or on weekends please contact  to page on-call fellow

## 2025-06-25 NOTE — CONSULT NOTE ADULT - ATTENDING COMMENTS
This is a 34 year old woman with a history of anemia and sickele francisco ltraite, she presnted to the hospital for rash fevers and headaches. Upon arrival to the hospital was found to be midlyl pancytopenic. WBC 1.8k/ul ANC 1030, Hg 11.3 pltes 146, these worsened slightly during hospital stay with an ANC of 500.  Peripheral smaer shows normocytic normochromic RBC, WBC diminished but normal maturity.  CBC was normal a month ago.    Suspect reactive pancytopenia which should resolve wi th treatment of underling inflammatory illness ,Given history suspect infection.

## 2025-06-25 NOTE — PROGRESS NOTE ADULT - PROBLEM SELECTOR PLAN 2
- Severe Neutropenia   - Heme/Onc consulted - Severe Neutropenia   - Heme/Onc consulted, appreciate recs   - F/u on infectious workup   - Check daily CBC w/diff  - per Heme/Onc, if blood counts continue to worse, will re-assess need for additional workup such as bone marrow biopsy

## 2025-06-25 NOTE — PROGRESS NOTE ADULT - PROBLEM SELECTOR PLAN 1
Patient reporting fevers up to 102, associated with headache and rash.  - RVP negative  - UA negative  - Leukopenia with bandemia and eosinophilia  - LP: neutrophils 4, increased protein, normal glucose, CSF PCR negative    Plan:  - ID consulted, appreciate recs  - HIV, syphilis, gonorrhea  - Lyme serology and CSF PCR  - Rickettsial serology  - Babesia PCR serum  - EBV serology  - [place] antibodies, serum  - Hepatitis panel  - Motrin PRN for pain  - If rash not improving, derm consult  - F/u blood cultures Patient reporting fevers up to 102, associated with headache and rash.  - RVP negative  - UA negative  - Leukopenia with bandemia and eosinophilia  - LP: neutrophils 4, increased protein, normal glucose, CSF PCR negative    Plan:  - ID consulted, appreciate recs  - non-syphilis, gonorrhea  - Lyme serology and CSF PCR  - Rickettsial serology  - Babesia PCR serum  - EBV serology  - [place] antibodies, serum  - Hepatitis panel  - Motrin PRN for pain  - If rash not improving, derm consult  - F/u blood cultures Patient reporting fevers up to 102, associated with headache and rash.  - RVP negative  - UA negative  - Leukopenia with bandemia and eosinophilia  - LP: neutrophils 4, increased protein, normal glucose, CSF PCR negative  - negative HIV, Treponema ab, Hepatitis Panel    Plan:  - ID consulted, appreciate recs  - continue doxycycline 100 mg po q 12  - F/u on:    Lyme CSF PCR, Rickettsial serology, Babesia PCR serum, EBV serology [place] antibodies, serum, parvovirus Ab    - Motrin PRN for pain  - If rash not improving, derm consult  - F/u blood cultures

## 2025-06-25 NOTE — PROGRESS NOTE ADULT - ASSESSMENT
Patient is a 35yo female with anemia and sickle cell trait presenting with petechial rash, fever, and headache. Although LP is bloody, history and CT less concerning for SAH. Petechial rash may be concerning for meningococcal septicemia, paxton mountain spotted fever, or disseminated gonococcal infection, but patient overall well-appearing. However, CSF not consistent with meningitis. Clinical picture more concerning for other viral or tick-borne illness. Patient is a 33yo female with anemia and sickle cell trait presenting with petechial rash, fever, and headache. Although LP is bloody, history and CT less concerning for SAH. Petechial rash may be concerning for meningococcal septicemia, paxton mountain spotted fever, or disseminated gonococcal infection, but patient overall well-appearing. However, CSF not consistent with meningitis. Clinical picture more concerning for other infectious etiology vs. autoimmune.  Patient is a 35yo female with anemia and sickle cell trait presenting with petechial rash, fever, and headache. Although LP is bloody, history and CT less concerning for SAH. Petechial rash may be concerning for meningococcal septicemia, paxton mountain spotted fever, or disseminated gonococcal infection, but patient overall well-appearing. However, CSF not consistent with meningitis. Clinical picture more concerning for other infectious etiology vs. autoimmune etiology.

## 2025-06-25 NOTE — PROGRESS NOTE ADULT - PROBLEM SELECTOR PLAN 3
- Heme/Onc Consulted - Heme/Onc consulted, appreciate recs   - F/u on infectious workup   - Check daily CBC w/diff  - per Heme/Onc, if blood counts continue to worse, will re-assess need for additional workup such as bone marrow biopsy

## 2025-06-25 NOTE — PROGRESS NOTE ADULT - SUBJECTIVE AND OBJECTIVE BOX
Patient is a 34y old  Female who presents with a chief complaint of Fever, Headache, Rash (25 Jun 2025 14:58)      SUBJECTIVE / OVERNIGHT EVENTS:    MEDICATIONS  (STANDING):  chlorhexidine 2% Cloths 1 Application(s) Topical daily  doxycycline monohydrate Capsule 100 milliGRAM(s) Oral every 12 hours    MEDICATIONS  (PRN):  acetaminophen     Tablet .. 650 milliGRAM(s) Oral every 6 hours PRN Moderate Pain (4 - 6)  ibuprofen  Tablet. 400 milliGRAM(s) Oral every 6 hours PRN Moderate Pain (4 - 6)      CAPILLARY BLOOD GLUCOSE        I&O's Summary      Vital Signs Last 24 Hrs  T(C): 36.7 (25 Jun 2025 12:31), Max: 37.2 (25 Jun 2025 05:17)  T(F): 98.1 (25 Jun 2025 12:31), Max: 99 (25 Jun 2025 05:17)  HR: 63 (25 Jun 2025 12:31) (63 - 99)  BP: 91/61 (25 Jun 2025 12:31) (89/53 - 99/57)  BP(mean): --  RR: 16 (25 Jun 2025 12:31) (16 - 17)  SpO2: 100% (25 Jun 2025 12:31) (99% - 100%)    Parameters below as of 25 Jun 2025 12:31  Patient On (Oxygen Delivery Method): room air        PHYSICAL EXAM:  GENERAL: NAD, well-developed, well-nourished  HEAD: Atraumatic, Normocephalic  EYES: EOMI, PERRLA, conjunctiva and sclera clear  NECK: Supple, No JVD  CHEST/LUNG: Clear to auscultation bilaterally; No wheezes or crackles  HEART: Normal S1/S2; Regular rate and rhythm; No murmurs, rubs, or gallops  ABDOMEN: Soft, Nontender, Nondistended; Bowel sounds present  EXTREMITIES: 2+ Peripheral Pulses; No clubbing, cyanosis, or edema  PSYCH: A&Ox3  NEUROLOGY: no focal neurologic deficit  SKIN: No rashes or lesions    LABS:                        11.5   1.69  )-----------( 129      ( 25 Jun 2025 05:55 )             35.1      06-25    140  |  106  |  8   ----------------------------<  92  3.4[L]   |  23  |  0.75    Ca    8.8      25 Jun 2025 05:55  Phos  3.3     06-25  Mg     1.90     06-25    TPro  6.9  /  Alb  3.9  /  TBili  <0.2  /  DBili  x   /  AST  17  /  ALT  13  /  AlkPhos  38[L]  06-25          Urinalysis Basic - ( 25 Jun 2025 05:55 )    Color: x / Appearance: x / SG: x / pH: x  Gluc: 92 mg/dL / Ketone: x  / Bili: x / Urobili: x   Blood: x / Protein: x / Nitrite: x   Leuk Esterase: x / RBC: x / WBC x   Sq Epi: x / Non Sq Epi: x / Bacteria: x        RADIOLOGY & ADDITIONAL TESTS:    Imaging Personally Reviewed:    Consultant(s) Notes Reviewed:      Care Discussed with Consultants/Other Providers:   Patient is a 34y old  Female who presents with a chief complaint of Fever, Headache, Rash (25 Jun 2025 14:58)      SUBJECTIVE / OVERNIGHT EVENTS:    OVN: Patient experienced lateral headaches also felt around the eyes. No associated N/V, photophobia, dizziness nuchal rigidity. Patient was given Motrin and Tylenol. On re-evaluation in the evening, patient stated headache had resolved after receiving pain medications.     Denies chest pain, SOB, abdominal pain, dysuria, hematuria  Patient is ambulating.     MEDICATIONS  (STANDING):  chlorhexidine 2% Cloths 1 Application(s) Topical daily  doxycycline monohydrate Capsule 100 milliGRAM(s) Oral every 12 hours    MEDICATIONS  (PRN):  acetaminophen     Tablet .. 650 milliGRAM(s) Oral every 6 hours PRN Moderate Pain (4 - 6)  ibuprofen  Tablet. 400 milliGRAM(s) Oral every 6 hours PRN Moderate Pain (4 - 6)      CAPILLARY BLOOD GLUCOSE        I&O's Summary      Vital Signs Last 24 Hrs  T(C): 36.7 (25 Jun 2025 12:31), Max: 37.2 (25 Jun 2025 05:17)  T(F): 98.1 (25 Jun 2025 12:31), Max: 99 (25 Jun 2025 05:17)  HR: 63 (25 Jun 2025 12:31) (63 - 99)  BP: 91/61 (25 Jun 2025 12:31) (89/53 - 99/57)  BP(mean): --  RR: 16 (25 Jun 2025 12:31) (16 - 17)  SpO2: 100% (25 Jun 2025 12:31) (99% - 100%)    Parameters below as of 25 Jun 2025 12:31  Patient On (Oxygen Delivery Method): room air        PHYSICAL EXAM:  GENERAL: NAD, well-developed, well-nourished  HEAD: Atraumatic, Normocephalic  EYES: EOMI, PERRLA, conjunctiva and sclera clear  NECK: Supple, No JVD  CHEST/LUNG: Clear to auscultation bilaterally; No wheezes or crackles  HEART: Normal S1/S2; Regular rate and rhythm; No murmurs, rubs, or gallops  ABDOMEN: Soft, Nontender, Nondistended; Bowel sounds present  EXTREMITIES: 2+ Peripheral Pulses; No clubbing, cyanosis, or edema  PSYCH: A&Ox3  NEUROLOGY: no focal neurologic deficit  SKIN: No rashes or lesions    LABS:                        11.5   1.69  )-----------( 129      ( 25 Jun 2025 05:55 )             35.1      06-25    140  |  106  |  8   ----------------------------<  92  3.4[L]   |  23  |  0.75    Ca    8.8      25 Jun 2025 05:55  Phos  3.3     06-25  Mg     1.90     06-25    TPro  6.9  /  Alb  3.9  /  TBili  <0.2  /  DBili  x   /  AST  17  /  ALT  13  /  AlkPhos  38[L]  06-25          Urinalysis Basic - ( 25 Jun 2025 05:55 )    Color: x / Appearance: x / SG: x / pH: x  Gluc: 92 mg/dL / Ketone: x  / Bili: x / Urobili: x   Blood: x / Protein: x / Nitrite: x   Leuk Esterase: x / RBC: x / WBC x   Sq Epi: x / Non Sq Epi: x / Bacteria: x        RADIOLOGY & ADDITIONAL TESTS:    Imaging Personally Reviewed:    Consultant(s) Notes Reviewed:      Care Discussed with Consultants/Other Providers:   Patient is a 34y old  Female who presents with a chief complaint of Fever, Headache, Rash (25 Jun 2025 14:58)      SUBJECTIVE / OVERNIGHT EVENTS:    OVN: Patient experienced lateral headaches also felt around the eyes. No associated N/V, photophobia, dizziness nuchal rigidity. Patient was given Motrin and Tylenol. On re-evaluation in the evening, patient stated headache improved after receiving pain medications.     Denies chest pain, SOB, abdominal pain, dysuria, hematuria. Improving petechial rash.   Patient is ambulating.     MEDICATIONS  (STANDING):  chlorhexidine 2% Cloths 1 Application(s) Topical daily  doxycycline monohydrate Capsule 100 milliGRAM(s) Oral every 12 hours    MEDICATIONS  (PRN):  acetaminophen     Tablet .. 650 milliGRAM(s) Oral every 6 hours PRN Moderate Pain (4 - 6)  ibuprofen  Tablet. 400 milliGRAM(s) Oral every 6 hours PRN Moderate Pain (4 - 6)      CAPILLARY BLOOD GLUCOSE        I&O's Summary      Vital Signs Last 24 Hrs  T(C): 36.7 (25 Jun 2025 12:31), Max: 37.2 (25 Jun 2025 05:17)  T(F): 98.1 (25 Jun 2025 12:31), Max: 99 (25 Jun 2025 05:17)  HR: 63 (25 Jun 2025 12:31) (63 - 99)  BP: 91/61 (25 Jun 2025 12:31) (89/53 - 99/57)  BP(mean): --  RR: 16 (25 Jun 2025 12:31) (16 - 17)  SpO2: 100% (25 Jun 2025 12:31) (99% - 100%)    Parameters below as of 25 Jun 2025 12:31  Patient On (Oxygen Delivery Method): room air        PHYSICAL EXAM:  GENERAL: NAD, well-developed, well-nourished  HEAD: Atraumatic, Normocephalic  EYES: EOMI, PERRLA, conjunctiva and sclera clear  NECK: Supple, No JVD  CHEST/LUNG: Clear to auscultation bilaterally; No wheezes or crackles  HEART: Normal S1/S2; Regular rate and rhythm; No murmurs, rubs, or gallops  ABDOMEN: Soft, Nontender, Nondistended; Bowel sounds present  EXTREMITIES: 2+ Peripheral Pulses; No clubbing, cyanosis, or edema  PSYCH: A&Ox3  NEUROLOGY: no focal neurologic deficit  SKIN: Petechial rash on b/l feet, buttock    LABS:                        11.5   1.69  )-----------( 129      ( 25 Jun 2025 05:55 )             35.1      06-25    140  |  106  |  8   ----------------------------<  92  3.4[L]   |  23  |  0.75    Ca    8.8      25 Jun 2025 05:55  Phos  3.3     06-25  Mg     1.90     06-25    TPro  6.9  /  Alb  3.9  /  TBili  <0.2  /  DBili  x   /  AST  17  /  ALT  13  /  AlkPhos  38[L]  06-25          Urinalysis Basic - ( 25 Jun 2025 05:55 )    Color: x / Appearance: x / SG: x / pH: x  Gluc: 92 mg/dL / Ketone: x  / Bili: x / Urobili: x   Blood: x / Protein: x / Nitrite: x   Leuk Esterase: x / RBC: x / WBC x   Sq Epi: x / Non Sq Epi: x / Bacteria: x        RADIOLOGY & ADDITIONAL TESTS:    Imaging Personally Reviewed:    Consultant(s) Notes Reviewed:      Care Discussed with Consultants/Other Providers:

## 2025-06-25 NOTE — PROGRESS NOTE ADULT - SUBJECTIVE AND OBJECTIVE BOX
Follow Up:      Inverval History/ROS:Patient is a 34y old  Female who presents with a chief complaint of Fever, Headache, Rash (24 Jun 2025 14:01)    C/o headache  No fever  No new areas of petechia  Allergies    No Known Allergies    Intolerances        ANTIMICROBIALS:  doxycycline monohydrate Capsule 100 every 12 hours      OTHER MEDS:  acetaminophen     Tablet .. 650 milliGRAM(s) Oral every 6 hours PRN  chlorhexidine 2% Cloths 1 Application(s) Topical daily  ibuprofen  Tablet. 400 milliGRAM(s) Oral every 6 hours PRN  magnesium sulfate  IVPB 2 Gram(s) IV Intermittent once      Vital Signs Last 24 Hrs  T(C): 37.2 (25 Jun 2025 05:17), Max: 37.8 (24 Jun 2025 15:29)  T(F): 99 (25 Jun 2025 05:17), Max: 100 (24 Jun 2025 15:29)  HR: 99 (25 Jun 2025 05:17) (83 - 99)  BP: 99/57 (25 Jun 2025 05:17) (89/53 - 99/57)  BP(mean): --  RR: 17 (25 Jun 2025 05:17) (17 - 18)  SpO2: 100% (25 Jun 2025 05:17) (97% - 100%)    Parameters below as of 25 Jun 2025 05:17  Patient On (Oxygen Delivery Method): room air        PHYSICAL EXAM:  General: [ x] non-toxic  HEAD/EYES: [ ] PERRL [x ] white sclera [ ] icterus  ENT:  [ ] normal [ x] supple [ ] thrush [ ] pharyngeal exudate  Cardiovascular:   [ ] murmur [ x] normal [ ] PPM/AICD  Respiratory:  [x ] clear to ausculation bilaterally  GI:  x[ ] soft, non-tender, normal bowel sounds  :  [ ] hilliard [ ] no CVA tenderness   Musculoskeletal:  [ ] no synovitis  Neurologic:  [ ] non-focal exam   Skin:  [x ] no rash  Lymph: [ x] no lymphadenopathy  Psychiatric:  [ ] appropriate affect [ ] alert & oriented  Lines:  [ x] no phlebitis [ ] central line                                11.5   1.69  )-----------( 129      ( 25 Jun 2025 05:55 )             35.1       06-25    140  |  106  |  8   ----------------------------<  92  3.4[L]   |  23  |  0.75    Ca    8.8      25 Jun 2025 05:55  Phos  3.3     06-25  Mg     1.90     06-25    TPro  6.9  /  Alb  3.9  /  TBili  <0.2  /  DBili  x   /  AST  17  /  ALT  13  /  AlkPhos  38[L]  06-25      Urinalysis Basic - ( 25 Jun 2025 05:55 )    Color: x / Appearance: x / SG: x / pH: x  Gluc: 92 mg/dL / Ketone: x  / Bili: x / Urobili: x   Blood: x / Protein: x / Nitrite: x   Leuk Esterase: x / RBC: x / WBC x   Sq Epi: x / Non Sq Epi: x / Bacteria: x        MICROBIOLOGY:Culture Results:   No growth to date. (06-24-25 @ 04:40)  Culture Results:   No growth at 24 hours (06-24-25 @ 00:10)  Culture Results:   No growth at 24 hours (06-24-25 @ 00:00)      RADIOLOGY:

## 2025-06-25 NOTE — CONSULT NOTE ADULT - SUBJECTIVE AND OBJECTIVE BOX
HPI: Anabell Chau is a 34 year-old female with PMH of sickle cell train presented with a three day history of headache, fever, and rash. She reports Patient reports an erythematous rash and itchiness in her groin and buttocks region. She also reports similar rash on her feet. Reports developing a severe headache over the last few days. Reports subjective fevers.     PAST MEDICAL & SURGICAL HISTORY:  Cutaneous abscess, unspecified    Disorder of the skin and subcutaneous tissue, unspecified    Sickle cell trait    History of anemia    H/O  section  2019      Allergies  No Known Allergies    Intolerances        MEDICATIONS  (STANDING):  chlorhexidine 2% Cloths 1 Application(s) Topical daily  doxycycline monohydrate Capsule 100 milliGRAM(s) Oral every 12 hours    MEDICATIONS  (PRN):  acetaminophen     Tablet .. 650 milliGRAM(s) Oral every 6 hours PRN Moderate Pain (4 - 6)  ibuprofen  Tablet. 400 milliGRAM(s) Oral every 6 hours PRN Moderate Pain (4 - 6)      FAMILY HISTORY:    SOCIAL HISTORY: No EtOH, no tobacco    REVIEW OF SYSTEMS:  CONSTITUTIONAL: No weakness, fevers or chills  EYES/ENT: No visual changes;  No vertigo or throat pain   NECK: No pain or stiffness  RESPIRATORY: No cough, wheezing, hemoptysis; No shortness of breath  CARDIOVASCULAR: No chest pain or palpitations  GASTROINTESTINAL: No abdominal or epigastric pain. No nausea, vomiting, or hematemesis; No diarrhea or constipation. No melena or hematochezia.  GENITOURINARY: No dysuria, frequency or hematuria  NEUROLOGICAL: No numbness or weakness  SKIN: +erythematous and pruritic rash on feet, groin and buttocks  All other review of systems is negative unless indicated above.    Height (cm): 165.1 ( @ 18:20)  Weight (kg): 54.4 ( @ 18:20)  BMI (kg/m2): 20 ( @ 18:20)  BSA (m2): 1.59 ( @ 18:20)    T(F): 98.1 (25 @ 12:31), Max: 99 (25 @ 05:17)  HR: 63 (25 @ 12:31)  BP: 91/61 (25 @ 12:31)  RR: 16 (25 @ 12:31)  SpO2: 100% (25 @ 12:31)  Wt(kg): --    GENERAL: NAD, resting in bed  HEAD:  Atraumatic, Normocephalic  EYES: EOMI, conjunctiva and sclera clear  NECK: Supple  CHEST/LUNG: Clear to auscultation bilaterally  HEART: Regular rate and rhythm  ABDOMEN: Soft, Nontender, Nondistended  EXTREMITIES:  2+ Peripheral Pulses, No LE edema  NEUROLOGY: AOx3  SKIN: +mild erythematous rash on dorsum of feet and buttocks                          11.5   1.69  )-----------( 129      ( 2025 05:55 )             35.1           140  |  106  |  8   ----------------------------<  92  3.4[L]   |  23  |  0.75    Ca    8.8      2025 05:55  Phos  3.3       Mg     1.90         TPro  6.9  /  Alb  3.9  /  TBili  <0.2  /  DBili  x   /  AST  17  /  ALT  13  /  AlkPhos  38[L]        Phosphorus: 3.3 mg/dL ( @ 05:55)  Magnesium: 1.90 mg/dL ( @ 05:55)          Clean Catch Clean Catch (Midstream)   @ 16:16   <10,000 CFU/mL Normal Urogenital Lizz  --  --      CSF CSF   @ 04:40   No growth to date.  --    No polymorphonuclear cells seen  No organisms seen  by cytocentrifuge      Blood Blood-Peripheral   @ 00:10   No growth at 24 hours  --  --      Blood Blood-Peripheral   @ 00:00   No growth at 24 hours  --  --

## 2025-06-26 LAB
ALBUMIN SERPL ELPH-MCNC: 3.9 G/DL — SIGNIFICANT CHANGE UP (ref 3.3–5)
ALP SERPL-CCNC: 38 U/L — LOW (ref 40–120)
ALT FLD-CCNC: 14 U/L — SIGNIFICANT CHANGE UP (ref 4–33)
ANION GAP SERPL CALC-SCNC: 11 MMOL/L — SIGNIFICANT CHANGE UP (ref 7–14)
AST SERPL-CCNC: 20 U/L — SIGNIFICANT CHANGE UP (ref 4–32)
B19V IGG SER-ACNC: 0.88 INDEX — SIGNIFICANT CHANGE UP (ref 0–0.9)
B19V IGG+IGM SER-IMP: NEGATIVE — SIGNIFICANT CHANGE UP
B19V IGG+IGM SER-IMP: SIGNIFICANT CHANGE UP
B19V IGM FLD-ACNC: 5.95 INDEX — HIGH (ref 0–0.9)
B19V IGM SER-ACNC: POSITIVE
BASOPHILS # BLD AUTO: 0.02 K/UL — SIGNIFICANT CHANGE UP (ref 0–0.2)
BASOPHILS # BLD MANUAL: 0.06 K/UL — SIGNIFICANT CHANGE UP (ref 0–0.2)
BASOPHILS NFR BLD AUTO: 0.5 % — SIGNIFICANT CHANGE UP (ref 0–2)
BASOPHILS NFR BLD MANUAL: 1.7 % — SIGNIFICANT CHANGE UP (ref 0–2)
BILIRUB SERPL-MCNC: <0.2 MG/DL — SIGNIFICANT CHANGE UP (ref 0.2–1.2)
BUN SERPL-MCNC: 11 MG/DL — SIGNIFICANT CHANGE UP (ref 7–23)
CALCIUM SERPL-MCNC: 9 MG/DL — SIGNIFICANT CHANGE UP (ref 8.4–10.5)
CHLORIDE SERPL-SCNC: 104 MMOL/L — SIGNIFICANT CHANGE UP (ref 98–107)
CO2 SERPL-SCNC: 24 MMOL/L — SIGNIFICANT CHANGE UP (ref 22–31)
CREAT SERPL-MCNC: 0.65 MG/DL — SIGNIFICANT CHANGE UP (ref 0.5–1.3)
EGFR: 119 ML/MIN/1.73M2 — SIGNIFICANT CHANGE UP
EGFR: 119 ML/MIN/1.73M2 — SIGNIFICANT CHANGE UP
EOSINOPHIL # BLD AUTO: 0.28 K/UL — SIGNIFICANT CHANGE UP (ref 0–0.5)
EOSINOPHIL # BLD MANUAL: 0.16 K/UL — SIGNIFICANT CHANGE UP (ref 0–0.5)
EOSINOPHIL NFR BLD AUTO: 7.7 % — HIGH (ref 0–6)
EOSINOPHIL NFR BLD MANUAL: 4.3 % — SIGNIFICANT CHANGE UP (ref 0–6)
GIANT PLATELETS BLD QL SMEAR: PRESENT
GLUCOSE SERPL-MCNC: 94 MG/DL — SIGNIFICANT CHANGE UP (ref 70–99)
HCT VFR BLD CALC: 34 % — LOW (ref 34.5–45)
HGB BLD-MCNC: 11.5 G/DL — SIGNIFICANT CHANGE UP (ref 11.5–15.5)
IMM GRANULOCYTES # BLD AUTO: 0.02 K/UL — SIGNIFICANT CHANGE UP (ref 0–0.07)
IMM GRANULOCYTES NFR BLD AUTO: 0.5 % — SIGNIFICANT CHANGE UP (ref 0–0.9)
LYMPHOCYTES # BLD AUTO: 1.99 K/UL — SIGNIFICANT CHANGE UP (ref 1–3.3)
LYMPHOCYTES # BLD MANUAL: 1.6 K/UL — SIGNIFICANT CHANGE UP (ref 1–3.3)
LYMPHOCYTES NFR BLD AUTO: 54.7 % — HIGH (ref 13–44)
LYMPHOCYTES NFR BLD MANUAL: 44 % — SIGNIFICANT CHANGE UP (ref 13–44)
MAGNESIUM SERPL-MCNC: 1.9 MG/DL — SIGNIFICANT CHANGE UP (ref 1.6–2.6)
MANUAL NEUTROPHIL BANDS #: 0.44 K/UL — SIGNIFICANT CHANGE UP (ref 0–0.84)
MANUAL PLASMA CELLS #: 0.06 K/UL — HIGH (ref 0–0)
MANUAL PLASMA CELLS %: 1.7 % — HIGH (ref 0–0)
MANUAL REACTIVE LYMPHOCYTES #: 0.06 K/UL — SIGNIFICANT CHANGE UP (ref 0–0.63)
MCHC RBC-ENTMCNC: 27.8 PG — SIGNIFICANT CHANGE UP (ref 27–34)
MCHC RBC-ENTMCNC: 33.8 G/DL — SIGNIFICANT CHANGE UP (ref 32–36)
MCV RBC AUTO: 82.1 FL — SIGNIFICANT CHANGE UP (ref 80–100)
MONOCYTES # BLD AUTO: 0.42 K/UL — SIGNIFICANT CHANGE UP (ref 0–0.9)
MONOCYTES # BLD MANUAL: 0.31 K/UL — SIGNIFICANT CHANGE UP (ref 0–0.9)
MONOCYTES NFR BLD AUTO: 11.5 % — SIGNIFICANT CHANGE UP (ref 2–14)
MONOCYTES NFR BLD MANUAL: 8.6 % — SIGNIFICANT CHANGE UP (ref 2–14)
NEUTROPHILS # BLD AUTO: 0.91 K/UL — LOW (ref 1.8–7.4)
NEUTROPHILS # BLD MANUAL: 0.94 K/UL — LOW (ref 1.8–7.4)
NEUTROPHILS NFR BLD AUTO: 25.1 % — LOW (ref 43–77)
NEUTROPHILS NFR BLD MANUAL: 25.9 % — LOW (ref 43–77)
NEUTS BAND # BLD: 12.1 % — CRITICAL HIGH (ref 0–8)
NEUTS BAND NFR BLD: 12.1 % — CRITICAL HIGH (ref 0–8)
NRBC # BLD AUTO: 0 K/UL — SIGNIFICANT CHANGE UP (ref 0–0)
NRBC # FLD: 0 K/UL — SIGNIFICANT CHANGE UP (ref 0–0)
NRBC BLD AUTO-RTO: 0 /100 WBCS — SIGNIFICANT CHANGE UP (ref 0–0)
PHOSPHATE SERPL-MCNC: 3 MG/DL — SIGNIFICANT CHANGE UP (ref 2.5–4.5)
PLAT MORPH BLD: NORMAL — SIGNIFICANT CHANGE UP
PLATELET # BLD AUTO: 156 K/UL — SIGNIFICANT CHANGE UP (ref 150–400)
PLATELET COUNT - ESTIMATE: NORMAL — SIGNIFICANT CHANGE UP
PMV BLD: 10.8 FL — SIGNIFICANT CHANGE UP (ref 7–13)
POTASSIUM SERPL-MCNC: 3.9 MMOL/L — SIGNIFICANT CHANGE UP (ref 3.5–5.3)
POTASSIUM SERPL-SCNC: 3.9 MMOL/L — SIGNIFICANT CHANGE UP (ref 3.5–5.3)
PROT SERPL-MCNC: 7 G/DL — SIGNIFICANT CHANGE UP (ref 6–8.3)
RBC # BLD: 4.14 M/UL — SIGNIFICANT CHANGE UP (ref 3.8–5.2)
RBC # FLD: 12.4 % — SIGNIFICANT CHANGE UP (ref 10.3–14.5)
RBC BLD AUTO: NORMAL — SIGNIFICANT CHANGE UP
SMUDGE CELLS # BLD: PRESENT
SODIUM SERPL-SCNC: 139 MMOL/L — SIGNIFICANT CHANGE UP (ref 135–145)
VARIANT LYMPHS # BLD: 1.7 % — SIGNIFICANT CHANGE UP (ref 0–6)
VARIANT LYMPHS NFR BLD MANUAL: 1.7 % — SIGNIFICANT CHANGE UP (ref 0–6)
WBC # BLD: 3.64 K/UL — LOW (ref 3.8–10.5)
WBC # FLD AUTO: 3.64 K/UL — LOW (ref 3.8–10.5)
WNV IGG CSF IA-ACNC: POSITIVE
WNV IGM CSF IA-ACNC: NEGATIVE — SIGNIFICANT CHANGE UP

## 2025-06-26 PROCEDURE — G0545: CPT

## 2025-06-26 PROCEDURE — 99232 SBSQ HOSP IP/OBS MODERATE 35: CPT | Mod: GC

## 2025-06-26 PROCEDURE — 99232 SBSQ HOSP IP/OBS MODERATE 35: CPT

## 2025-06-26 RX ADMIN — Medication 400 MILLIGRAM(S): at 09:42

## 2025-06-26 RX ADMIN — Medication 100 MILLIGRAM(S): at 17:43

## 2025-06-26 RX ADMIN — Medication 400 MILLIGRAM(S): at 19:24

## 2025-06-26 RX ADMIN — Medication 400 MILLIGRAM(S): at 20:18

## 2025-06-26 RX ADMIN — Medication 100 MILLIGRAM(S): at 05:41

## 2025-06-26 RX ADMIN — Medication 400 MILLIGRAM(S): at 08:42

## 2025-06-26 NOTE — PROGRESS NOTE ADULT - PROBLEM SELECTOR PLAN 3
- Heme/Onc consulted, appreciate recs   - F/u on infectious workup   - Check daily CBC w/diff  - per Heme/Onc, if blood counts continue to worse, will re-assess need for additional workup such as bone marrow biopsy IMPROVING    - Heme/Onc consulted, appreciate recs   - F/u on infectious workup   - Check daily CBC w/diff  - per Heme/Onc, if blood counts continue to worse, will re-assess need for additional workup such as bone marrow biopsy

## 2025-06-26 NOTE — PROGRESS NOTE ADULT - SUBJECTIVE AND OBJECTIVE BOX
Follow Up:      Inverval History/ROS:Patient is a 34y old  Female who presents with a chief complaint of Fever, Headache, Rash (26 Jun 2025 12:08)    C/o headache-->but improved  No fever      Allergies    No Known Allergies    Intolerances        ANTIMICROBIALS:  doxycycline monohydrate Capsule 100 every 12 hours      OTHER MEDS:  acetaminophen     Tablet .. 650 milliGRAM(s) Oral every 6 hours PRN  chlorhexidine 2% Cloths 1 Application(s) Topical daily  ibuprofen  Tablet. 400 milliGRAM(s) Oral every 6 hours PRN      Vital Signs Last 24 Hrs  T(C): 36.8 (26 Jun 2025 12:31), Max: 36.8 (26 Jun 2025 05:12)  T(F): 98.2 (26 Jun 2025 12:31), Max: 98.2 (26 Jun 2025 05:12)  HR: 61 (26 Jun 2025 12:31) (61 - 77)  BP: 92/63 (26 Jun 2025 12:31) (87/50 - 97/58)  BP(mean): --  RR: 17 (26 Jun 2025 12:31) (16 - 17)  SpO2: 100% (26 Jun 2025 12:31) (94% - 100%)    Parameters below as of 26 Jun 2025 12:31  Patient On (Oxygen Delivery Method): room air        PHYSICAL EXAM:  General: [ x] non-toxic  HEAD/EYES: [ ] PERRL [ ] white sclera [ ] icterus  ENT:  [ ] normal [ x] supple [ ] thrush [ ] pharyngeal exudate  Cardiovascular:   [ ] murmur [x ] normal [ ] PPM/AICD  Respiratory:  x[ ] clear to ausculation bilaterally  GI:  [x ] soft, non-tender, normal bowel sounds  :  [ ] hilliard [ ] no CVA tenderness   Musculoskeletal:  [ ] no synovitis  Neurologic:  [ ] non-focal exam   Skin:  x[ ] no rash  Lymph: [x ] no lymphadenopathy  Psychiatric:  [ ] appropriate affect [ ] alert & oriented  Lines:  [ x] no phlebitis [ ] central line                                11.5   3.64  )-----------( 156      ( 26 Jun 2025 06:06 )             34.0       06-26    139  |  104  |  11  ----------------------------<  94  3.9   |  24  |  0.65    Ca    9.0      26 Jun 2025 06:06  Phos  3.0     06-26  Mg     1.90     06-26    TPro  7.0  /  Alb  3.9  /  TBili  <0.2  /  DBili  x   /  AST  20  /  ALT  14  /  AlkPhos  38[L]  06-26      Urinalysis Basic - ( 26 Jun 2025 06:06 )    Color: x / Appearance: x / SG: x / pH: x  Gluc: 94 mg/dL / Ketone: x  / Bili: x / Urobili: x   Blood: x / Protein: x / Nitrite: x   Leuk Esterase: x / RBC: x / WBC x   Sq Epi: x / Non Sq Epi: x / Bacteria: x        MICROBIOLOGY:Culture Results:   <10,000 CFU/mL Normal Urogenital Lizz (06-24-25 @ 16:16)  Culture Results:   No growth to date. (06-24-25 @ 04:40)  Culture Results:   No growth at 48 Hours (06-24-25 @ 00:10)  Culture Results:   No growth at 48 Hours (06-24-25 @ 00:00)      RADIOLOGY:

## 2025-06-26 NOTE — PROGRESS NOTE ADULT - PROBLEM SELECTOR PLAN 1
Patient reporting fevers up to 102, associated with headache and rash.  - RVP negative  - UA negative  - Leukopenia with bandemia and eosinophilia  - LP: neutrophils 4, increased protein, normal glucose, CSF PCR negative  - negative HIV, Treponema ab, Hepatitis Panel    Plan:  - ID consulted, appreciate recs  - continue doxycycline 100 mg po q 12  - F/u on:    Lyme CSF PCR, Rickettsial serology, Babesia PCR serum, EBV serology [place] antibodies, serum, parvovirus Ab    - Motrin PRN for pain  - If rash not improving, derm consult  - F/u blood cultures Patient reporting fevers up to 102, associated with headache and rash.  - RVP negative  - UA negative  - Leukopenia with bandemia and eosinophilia  - LP: neutrophils 4, increased protein, normal glucose, CSF PCR negative  - Negative HIV, Treponema ab, Hepatitis Panel, EBV IgM, babesiosis negative; West Nile IgG positive but IgM negative, negative Lyme PCR CSF  - Parvovirus IgG negative, IgM positive    Plan:  - ID consulted, appreciate recs  - Continue doxycycline 100 mg po q 12  - F/u on:    Lyme antibodies CSF, Rickettsial serology, [place] antibodies (serum)  - Motrin PRN for pain  - F/u blood cultures

## 2025-06-26 NOTE — PROGRESS NOTE ADULT - PROBLEM SELECTOR PLAN 2
- Severe Neutropenia   - Heme/Onc consulted, appreciate recs   - F/u on infectious workup   - Check daily CBC w/diff  - per Heme/Onc, if blood counts continue to worse, will re-assess need for additional workup such as bone marrow biopsy IMPROVING    - Severe Neutropenia   - Heme/Onc consulted, appreciate recs   - F/u on infectious workup   - Check daily CBC w/diff  - per Heme/Onc, if blood counts continue to worse, will re-assess need for additional workup such as bone marrow biopsy

## 2025-06-26 NOTE — PROGRESS NOTE ADULT - ASSESSMENT
34 year old presented with body aches with rash. Associated subjective fever  [place]  Of note , pt was febrile on Saturday prior to lake exposure    Skin eruption that is pruritic.  She notes associated headache and subjective fever    Has leukopenia with elevated eosinophilia % (but not absolute #)    WBC improved- repeat with pmd next week.     ? viral syndrome([place], [place])  vs tick borne illness  Parvovirus IgM is positive  Can see skin rash and  cytopenia with parvovirus--- petechial rash on hands/ feet noted with parvovirus  HIV negative  RVP negative    Follow CBC with differential    Can check rickettsial serology   F/U ehlichia/ anaplasma testing  Check Serum West Nile Serology  Follow up blood cultures- NGTD      Continue doxycyline 100 mg po q 12 for now  If stable for dc --> plan 7 d course given pending serology

## 2025-06-26 NOTE — PROGRESS NOTE ADULT - ASSESSMENT
Patient is a 35yo female with anemia and sickle cell trait presenting with petechial rash, fever, and headache. Although LP is bloody, history and CT less concerning for SAH. Petechial rash may be concerning for meningococcal septicemia, paxton mountain spotted fever, or disseminated gonococcal infection, but patient overall well-appearing. However, CSF not consistent with meningitis. Clinical picture more concerning for other infectious etiology vs. autoimmune etiology.  Patient is a 35yo female with anemia and sickle cell trait presenting with petechial rash, fever, and headache. Although LP is bloody, history and CT less concerning for SAH. Petechial rash may be concerning for meningococcal septicemia, paxton mountain spotted fever, or disseminated gonococcal infection, but patient overall well-appearing. However, CSF not consistent with meningitis. Clinical picture more concerning for other infectious etiology vs. autoimmune etiology. Now with positive Parvovirus IgM. Clinical picture and pancytopenia improving, pending possible dc tomorrow.

## 2025-06-26 NOTE — PROGRESS NOTE ADULT - SUBJECTIVE AND OBJECTIVE BOX
Patient is a 34y old  Female who presents with a chief complaint of Fever, Headache, Rash (25 Jun 2025 18:57)      SUBJECTIVE / OVERNIGHT EVENTS:    MEDICATIONS  (STANDING):  chlorhexidine 2% Cloths 1 Application(s) Topical daily  doxycycline monohydrate Capsule 100 milliGRAM(s) Oral every 12 hours    MEDICATIONS  (PRN):  acetaminophen     Tablet .. 650 milliGRAM(s) Oral every 6 hours PRN Moderate Pain (4 - 6)  ibuprofen  Tablet. 400 milliGRAM(s) Oral every 6 hours PRN Moderate Pain (4 - 6)      CAPILLARY BLOOD GLUCOSE        I&O's Summary      Vital Signs Last 24 Hrs  T(C): 36.8 (26 Jun 2025 05:12), Max: 36.8 (26 Jun 2025 05:12)  T(F): 98.2 (26 Jun 2025 05:12), Max: 98.2 (26 Jun 2025 05:12)  HR: 77 (26 Jun 2025 05:12) (63 - 77)  BP: 87/50 (26 Jun 2025 05:12) (87/50 - 97/58)  BP(mean): --  RR: 16 (26 Jun 2025 05:12) (16 - 16)  SpO2: 98% (26 Jun 2025 05:12) (94% - 100%)    Parameters below as of 26 Jun 2025 05:12  Patient On (Oxygen Delivery Method): room air        PHYSICAL EXAM:  GENERAL: NAD, well-developed, well-nourished  HEAD: Atraumatic, Normocephalic  EYES: EOMI, PERRLA, conjunctiva and sclera clear  NECK: Supple, No JVD  CHEST/LUNG: Clear to auscultation bilaterally; No wheezes or crackles  HEART: Normal S1/S2; Regular rate and rhythm; No murmurs, rubs, or gallops  ABDOMEN: Soft, Nontender, Nondistended; Bowel sounds present  EXTREMITIES: 2+ Peripheral Pulses; No clubbing, cyanosis, or edema  PSYCH: A&Ox3  NEUROLOGY: no focal neurologic deficit  SKIN: No rashes or lesions    LABS:                        11.5   3.64  )-----------( 156      ( 26 Jun 2025 06:06 )             34.0      06-26    139  |  104  |  11  ----------------------------<  94  3.9   |  24  |  0.65    Ca    9.0      26 Jun 2025 06:06  Phos  3.0     06-26  Mg     1.90     06-26    TPro  7.0  /  Alb  3.9  /  TBili  <0.2  /  DBili  x   /  AST  20  /  ALT  14  /  AlkPhos  38[L]  06-26          Urinalysis Basic - ( 26 Jun 2025 06:06 )    Color: x / Appearance: x / SG: x / pH: x  Gluc: 94 mg/dL / Ketone: x  / Bili: x / Urobili: x   Blood: x / Protein: x / Nitrite: x   Leuk Esterase: x / RBC: x / WBC x   Sq Epi: x / Non Sq Epi: x / Bacteria: x        RADIOLOGY & ADDITIONAL TESTS:    Imaging Personally Reviewed:    Consultant(s) Notes Reviewed:      Care Discussed with Consultants/Other Providers:   Patient is a 34y old  Female who presents with a chief complaint of Fever, Headache, Rash (25 Jun 2025 18:57)      SUBJECTIVE / OVERNIGHT EVENTS: No overnight. Patient with 6/10 headache this morning of same quality. Rash is improving.  at bedside.    MEDICATIONS  (STANDING):  chlorhexidine 2% Cloths 1 Application(s) Topical daily  doxycycline monohydrate Capsule 100 milliGRAM(s) Oral every 12 hours    MEDICATIONS  (PRN):  acetaminophen     Tablet .. 650 milliGRAM(s) Oral every 6 hours PRN Moderate Pain (4 - 6)  ibuprofen  Tablet. 400 milliGRAM(s) Oral every 6 hours PRN Moderate Pain (4 - 6)      CAPILLARY BLOOD GLUCOSE        I&O's Summary      Vital Signs Last 24 Hrs  T(C): 36.8 (26 Jun 2025 05:12), Max: 36.8 (26 Jun 2025 05:12)  T(F): 98.2 (26 Jun 2025 05:12), Max: 98.2 (26 Jun 2025 05:12)  HR: 77 (26 Jun 2025 05:12) (63 - 77)  BP: 87/50 (26 Jun 2025 05:12) (87/50 - 97/58)  BP(mean): --  RR: 16 (26 Jun 2025 05:12) (16 - 16)  SpO2: 98% (26 Jun 2025 05:12) (94% - 100%)    Parameters below as of 26 Jun 2025 05:12  Patient On (Oxygen Delivery Method): room air    PHYSICAL EXAM:  GENERAL: NAD, lying in bed comfortably on RA  HEAD:  Atraumatic, Normocephalic  EYES: EOMI, conjunctiva and sclera clear  ENT: Moist mucous membranes, no lesions of the oral cavity  NECK: Supple, no rigidity  CHEST/LUNG: Clear to auscultation bilaterally; No rales, rhonchi, wheezing, or rubs. Unlabored respirations  HEART: Regular rate and rhythm; No murmurs, rubs, or gallops  ABDOMEN: BSx4; Soft, nontender, nondistended  EXTREMITIES: 2+ Peripheral Pulses No clubbing, cyanosis, or edema  NERVOUS SYSTEM:  A&Ox3, no focal deficits; strength and sensation intact  SKIN: Improving nonblanching petechial rash on dorsum of feet, minimally visible    LABS:                        11.5   3.64  )-----------( 156      ( 26 Jun 2025 06:06 )             34.0      06-26    139  |  104  |  11  ----------------------------<  94  3.9   |  24  |  0.65    Ca    9.0      26 Jun 2025 06:06  Phos  3.0     06-26  Mg     1.90     06-26    TPro  7.0  /  Alb  3.9  /  TBili  <0.2  /  DBili  x   /  AST  20  /  ALT  14  /  AlkPhos  38[L]  06-26          Urinalysis Basic - ( 26 Jun 2025 06:06 )    Color: x / Appearance: x / SG: x / pH: x  Gluc: 94 mg/dL / Ketone: x  / Bili: x / Urobili: x   Blood: x / Protein: x / Nitrite: x   Leuk Esterase: x / RBC: x / WBC x   Sq Epi: x / Non Sq Epi: x / Bacteria: x        RADIOLOGY & ADDITIONAL TESTS:    Imaging Personally Reviewed: None    Consultant(s) Notes Reviewed: None    Care Discussed with Consultants/Other Providers: None

## 2025-06-26 NOTE — PROGRESS NOTE ADULT - PROBLEM SELECTOR PLAN 4
DVT PPx: Ambulatory  Diet: Regular  Family: , at bedside today  Dispo: Admitted to medicine DVT PPx: Ambulatory  Diet: Regular  Family: , at bedside today  Dispo: Pending possible dc tomorrow

## 2025-06-27 ENCOUNTER — TRANSCRIPTION ENCOUNTER (OUTPATIENT)
Age: 35
End: 2025-06-27

## 2025-06-27 VITALS
SYSTOLIC BLOOD PRESSURE: 101 MMHG | OXYGEN SATURATION: 100 % | TEMPERATURE: 97 F | RESPIRATION RATE: 18 BRPM | DIASTOLIC BLOOD PRESSURE: 67 MMHG | HEART RATE: 86 BPM

## 2025-06-27 LAB
ALBUMIN SERPL ELPH-MCNC: 4.1 G/DL — SIGNIFICANT CHANGE UP (ref 3.3–5)
ALP SERPL-CCNC: 41 U/L — SIGNIFICANT CHANGE UP (ref 40–120)
ALT FLD-CCNC: 18 U/L — SIGNIFICANT CHANGE UP (ref 4–33)
ANION GAP SERPL CALC-SCNC: 14 MMOL/L — SIGNIFICANT CHANGE UP (ref 7–14)
AST SERPL-CCNC: 21 U/L — SIGNIFICANT CHANGE UP (ref 4–32)
BASOPHILS # BLD AUTO: 0.03 K/UL — SIGNIFICANT CHANGE UP (ref 0–0.2)
BASOPHILS NFR BLD AUTO: 0.6 % — SIGNIFICANT CHANGE UP (ref 0–2)
BILIRUB SERPL-MCNC: 0.2 MG/DL — SIGNIFICANT CHANGE UP (ref 0.2–1.2)
BUN SERPL-MCNC: 11 MG/DL — SIGNIFICANT CHANGE UP (ref 7–23)
CALCIUM SERPL-MCNC: 8.4 MG/DL — SIGNIFICANT CHANGE UP (ref 8.4–10.5)
CHLORIDE SERPL-SCNC: 103 MMOL/L — SIGNIFICANT CHANGE UP (ref 98–107)
CO2 SERPL-SCNC: 22 MMOL/L — SIGNIFICANT CHANGE UP (ref 22–31)
CREAT SERPL-MCNC: 0.71 MG/DL — SIGNIFICANT CHANGE UP (ref 0.5–1.3)
EGFR: 115 ML/MIN/1.73M2 — SIGNIFICANT CHANGE UP
EGFR: 115 ML/MIN/1.73M2 — SIGNIFICANT CHANGE UP
EOSINOPHIL # BLD AUTO: 0.26 K/UL — SIGNIFICANT CHANGE UP (ref 0–0.5)
EOSINOPHIL NFR BLD AUTO: 5.5 % — SIGNIFICANT CHANGE UP (ref 0–6)
GLUCOSE SERPL-MCNC: 99 MG/DL — SIGNIFICANT CHANGE UP (ref 70–99)
HCT VFR BLD CALC: 37.9 % — SIGNIFICANT CHANGE UP (ref 34.5–45)
HGB BLD-MCNC: 12.6 G/DL — SIGNIFICANT CHANGE UP (ref 11.5–15.5)
IMM GRANULOCYTES # BLD AUTO: 0.02 K/UL — SIGNIFICANT CHANGE UP (ref 0–0.07)
IMM GRANULOCYTES NFR BLD AUTO: 0.4 % — SIGNIFICANT CHANGE UP (ref 0–0.9)
LYMPHOCYTES # BLD AUTO: 2.22 K/UL — SIGNIFICANT CHANGE UP (ref 1–3.3)
LYMPHOCYTES NFR BLD AUTO: 47.2 % — HIGH (ref 13–44)
MAGNESIUM SERPL-MCNC: 1.8 MG/DL — SIGNIFICANT CHANGE UP (ref 1.6–2.6)
MCHC RBC-ENTMCNC: 27.5 PG — SIGNIFICANT CHANGE UP (ref 27–34)
MCHC RBC-ENTMCNC: 33.2 G/DL — SIGNIFICANT CHANGE UP (ref 32–36)
MCV RBC AUTO: 82.8 FL — SIGNIFICANT CHANGE UP (ref 80–100)
MONOCYTES # BLD AUTO: 0.39 K/UL — SIGNIFICANT CHANGE UP (ref 0–0.9)
MONOCYTES NFR BLD AUTO: 8.3 % — SIGNIFICANT CHANGE UP (ref 2–14)
NEUTROPHILS # BLD AUTO: 1.78 K/UL — LOW (ref 1.8–7.4)
NEUTROPHILS NFR BLD AUTO: 38 % — LOW (ref 43–77)
NRBC # BLD AUTO: 0 K/UL — SIGNIFICANT CHANGE UP (ref 0–0)
NRBC # FLD: 0 K/UL — SIGNIFICANT CHANGE UP (ref 0–0)
NRBC BLD AUTO-RTO: 0 /100 WBCS — SIGNIFICANT CHANGE UP (ref 0–0)
PHOSPHATE SERPL-MCNC: 3.5 MG/DL — SIGNIFICANT CHANGE UP (ref 2.5–4.5)
PLATELET # BLD AUTO: 167 K/UL — SIGNIFICANT CHANGE UP (ref 150–400)
PMV BLD: 10.7 FL — SIGNIFICANT CHANGE UP (ref 7–13)
POTASSIUM SERPL-MCNC: 3.8 MMOL/L — SIGNIFICANT CHANGE UP (ref 3.5–5.3)
POTASSIUM SERPL-SCNC: 3.8 MMOL/L — SIGNIFICANT CHANGE UP (ref 3.5–5.3)
PROT SERPL-MCNC: 7.7 G/DL — SIGNIFICANT CHANGE UP (ref 6–8.3)
RBC # BLD: 4.58 M/UL — SIGNIFICANT CHANGE UP (ref 3.8–5.2)
RBC # FLD: 12.2 % — SIGNIFICANT CHANGE UP (ref 10.3–14.5)
SODIUM SERPL-SCNC: 139 MMOL/L — SIGNIFICANT CHANGE UP (ref 135–145)
WBC # BLD: 4.7 K/UL — SIGNIFICANT CHANGE UP (ref 3.8–10.5)
WBC # FLD AUTO: 4.7 K/UL — SIGNIFICANT CHANGE UP (ref 3.8–10.5)
WNV IGG TITR FLD: POSITIVE
WNV IGM SPEC QL: NEGATIVE — SIGNIFICANT CHANGE UP

## 2025-06-27 PROCEDURE — 99239 HOSP IP/OBS DSCHRG MGMT >30: CPT | Mod: GC

## 2025-06-27 PROCEDURE — G0545: CPT

## 2025-06-27 PROCEDURE — 99232 SBSQ HOSP IP/OBS MODERATE 35: CPT

## 2025-06-27 RX ORDER — DOXYCYCLINE HYCLATE 100 MG
2 TABLET ORAL
Qty: 8 | Refills: 0
Start: 2025-06-27 | End: 2025-06-30

## 2025-06-27 RX ADMIN — Medication 100 MILLIGRAM(S): at 05:09

## 2025-06-27 NOTE — DISCHARGE NOTE PROVIDER - NSDCMRMEDTOKEN_GEN_ALL_CORE_FT
Monodox 50 mg oral capsule: 2 cap(s) orally every 12 hours Please start 6/27 evening (PM) until 7/1 morning (AM)

## 2025-06-27 NOTE — PROGRESS NOTE ADULT - SUBJECTIVE AND OBJECTIVE BOX
Follow Up:      Inverval History/ROS:Patient is a 34y old  Female who presents with a chief complaint of Fever, Headache, Rash (27 Jun 2025 08:52)    No fever  Feels better    Allergies    No Known Allergies    Intolerances        ANTIMICROBIALS:  doxycycline monohydrate Capsule 100 every 12 hours      OTHER MEDS:  acetaminophen     Tablet .. 650 milliGRAM(s) Oral every 6 hours PRN  chlorhexidine 2% Cloths 1 Application(s) Topical daily  ibuprofen  Tablet. 400 milliGRAM(s) Oral every 6 hours PRN      Vital Signs Last 24 Hrs  T(C): 36.3 (27 Jun 2025 14:00), Max: 37.2 (26 Jun 2025 21:14)  T(F): 97.4 (27 Jun 2025 14:00), Max: 98.9 (26 Jun 2025 21:14)  HR: 86 (27 Jun 2025 14:00) (60 - 86)  BP: 101/67 (27 Jun 2025 14:00) (88/53 - 101/67)  BP(mean): --  RR: 18 (27 Jun 2025 14:00) (17 - 18)  SpO2: 100% (27 Jun 2025 14:00) (96% - 100%)    Parameters below as of 27 Jun 2025 14:00  Patient On (Oxygen Delivery Method): room air        PHYSICAL EXAM:  General: [ ] non-toxic  HEAD/EYES: [ ] PERRL [x ] white sclera [ ] icterus  ENT:  [ ] normal [ ] supple [ ] thrush [ ] pharyngeal exudate  Cardiovascular:   [ ] murmur x[ ] normal [ ] PPM/AICD  Respiratory:  [ x] clear to ausculation bilaterally  GI:  [ x] soft, non-tender, normal bowel sounds  :  [ ] hilliard [ ] no CVA tenderness   Musculoskeletal:  [ ] no synovitis  Neurologic:  [ ] non-focal exam   Skin:  x[ ] no rash  Lymph: [ ] no lymphadenopathy  Psychiatric:  [ ] appropriate affect [ ] alert & oriented  Lines:  [x ] no phlebitis [ ] central line                                12.6   4.70  )-----------( 167      ( 27 Jun 2025 06:27 )             37.9       06-27    139  |  103  |  11  ----------------------------<  99  3.8   |  22  |  0.71    Ca    8.4      27 Jun 2025 06:27  Phos  3.5     06-27  Mg     1.80     06-27    TPro  7.7  /  Alb  4.1  /  TBili  0.2  /  DBili  x   /  AST  21  /  ALT  18  /  AlkPhos  41  06-27      Urinalysis Basic - ( 27 Jun 2025 06:27 )    Color: x / Appearance: x / SG: x / pH: x  Gluc: 99 mg/dL / Ketone: x  / Bili: x / Urobili: x   Blood: x / Protein: x / Nitrite: x   Leuk Esterase: x / RBC: x / WBC x   Sq Epi: x / Non Sq Epi: x / Bacteria: x        MICROBIOLOGY:Culture Results:   <10,000 CFU/mL Normal Urogenital Lizz (06-24-25 @ 16:16)  Culture Results:   No growth to date. (06-24-25 @ 04:40)  Culture Results:   No growth at 72 Hours (06-24-25 @ 00:10)  Culture Results:   No growth at 72 Hours (06-24-25 @ 00:00)      RADIOLOGY:

## 2025-06-27 NOTE — PROGRESS NOTE ADULT - PROVIDER SPECIALTY LIST ADULT
Infectious Disease
Internal Medicine
Infectious Disease
Infectious Disease

## 2025-06-27 NOTE — DISCHARGE NOTE NURSING/CASE MANAGEMENT/SOCIAL WORK - PATIENT PORTAL LINK FT
You can access the FollowMyHealth Patient Portal offered by [facility] by registering at the following website: [email or web address]. By joining [facility]’s FollowMyHealth portal, you will also be able to view your health information using other applications (apps) compatible with our system.

## 2025-06-27 NOTE — PROGRESS NOTE ADULT - PROBLEM SELECTOR PLAN 1
Patient reporting fevers up to 102, associated with headache and rash.  - RVP negative, UA negative  - Leukopenia with bandemia and eosinophilia, likely due to Parvovirus  - LP: neutrophils 4, increased protein, normal glucose, CSF PCR negative  - Negative HIV, Treponema ab, Hepatitis Panel, EBV IgM, babesiosis negative; [place] Nile IgG positive but IgM negative, negative Lyme PCR CSF  - Parvovirus IgG negative, IgM positive    Plan:  - ID consulted, appreciate recs  - Continue doxycycline 100 mg po q 12 7 day course (6/24-7/1)  - F/u on:  Lyme antibodies CSF, Rickettsial serology, [place] antibodies (serum), Ehrlichia/anaplasma PCR serum  - Motrin PRN for pain

## 2025-06-27 NOTE — PROGRESS NOTE ADULT - SUBJECTIVE AND OBJECTIVE BOX
Patient is a 34y old  Female who presents with a chief complaint of Fever, Headache, Rash (27 Jun 2025 05:44)      SUBJECTIVE / OVERNIGHT EVENTS:    MEDICATIONS  (STANDING):  chlorhexidine 2% Cloths 1 Application(s) Topical daily  doxycycline monohydrate Capsule 100 milliGRAM(s) Oral every 12 hours    MEDICATIONS  (PRN):  acetaminophen     Tablet .. 650 milliGRAM(s) Oral every 6 hours PRN Moderate Pain (4 - 6)  ibuprofen  Tablet. 400 milliGRAM(s) Oral every 6 hours PRN Moderate Pain (4 - 6)      CAPILLARY BLOOD GLUCOSE        I&O's Summary      Vital Signs Last 24 Hrs  T(C): 36.7 (27 Jun 2025 05:16), Max: 37.2 (26 Jun 2025 21:14)  T(F): 98.1 (27 Jun 2025 05:16), Max: 98.9 (26 Jun 2025 21:14)  HR: 60 (27 Jun 2025 05:16) (60 - 61)  BP: 88/53 (27 Jun 2025 05:16) (88/53 - 100/60)  BP(mean): --  RR: 17 (27 Jun 2025 05:16) (17 - 17)  SpO2: 96% (27 Jun 2025 05:16) (96% - 100%)    Parameters below as of 27 Jun 2025 05:16  Patient On (Oxygen Delivery Method): room air        PHYSICAL EXAM:  GENERAL: NAD, well-developed, well-nourished  HEAD: Atraumatic, Normocephalic  EYES: EOMI, PERRLA, conjunctiva and sclera clear  NECK: Supple, No JVD  CHEST/LUNG: Clear to auscultation bilaterally; No wheezes or crackles  HEART: Normal S1/S2; Regular rate and rhythm; No murmurs, rubs, or gallops  ABDOMEN: Soft, Nontender, Nondistended; Bowel sounds present  EXTREMITIES: 2+ Peripheral Pulses; No clubbing, cyanosis, or edema  PSYCH: A&Ox3  NEUROLOGY: no focal neurologic deficit  SKIN: No rashes or lesions    LABS:                        11.5   3.64  )-----------( 156      ( 26 Jun 2025 06:06 )             34.0      06-27    139  |  103  |  11  ----------------------------<  99  3.8   |  22  |  0.71    Ca    8.4      27 Jun 2025 06:27  Phos  3.5     06-27  Mg     1.80     06-27    TPro  7.7  /  Alb  4.1  /  TBili  0.2  /  DBili  x   /  AST  21  /  ALT  18  /  AlkPhos  41  06-27          Urinalysis Basic - ( 27 Jun 2025 06:27 )    Color: x / Appearance: x / SG: x / pH: x  Gluc: 99 mg/dL / Ketone: x  / Bili: x / Urobili: x   Blood: x / Protein: x / Nitrite: x   Leuk Esterase: x / RBC: x / WBC x   Sq Epi: x / Non Sq Epi: x / Bacteria: x        RADIOLOGY & ADDITIONAL TESTS:    Imaging Personally Reviewed:    Consultant(s) Notes Reviewed:      Care Discussed with Consultants/Other Providers:   Patient is a 34y old  Female who presents with a chief complaint of Fever, Headache, Rash (27 Jun 2025 05:44)      SUBJECTIVE / OVERNIGHT EVENTS:     used (Joey #[id number])  No acute events overnight. Patient was seen at bedside this morning. Endorsed having usual headache but stated pain medications have helped manage the pain. Denied fevers, chills, N/V, changes in vision, chest pain, SOB, abdominal pain, constipation, dysuria, hematuria.      MEDICATIONS  (STANDING):  chlorhexidine 2% Cloths 1 Application(s) Topical daily  doxycycline monohydrate Capsule 100 milliGRAM(s) Oral every 12 hours    MEDICATIONS  (PRN):  acetaminophen     Tablet .. 650 milliGRAM(s) Oral every 6 hours PRN Moderate Pain (4 - 6)  ibuprofen  Tablet. 400 milliGRAM(s) Oral every 6 hours PRN Moderate Pain (4 - 6)      CAPILLARY BLOOD GLUCOSE        I&O's Summary      Vital Signs Last 24 Hrs  T(C): 36.7 (27 Jun 2025 05:16), Max: 37.2 (26 Jun 2025 21:14)  T(F): 98.1 (27 Jun 2025 05:16), Max: 98.9 (26 Jun 2025 21:14)  HR: 60 (27 Jun 2025 05:16) (60 - 61)  BP: 88/53 (27 Jun 2025 05:16) (88/53 - 100/60)  BP(mean): --  RR: 17 (27 Jun 2025 05:16) (17 - 17)  SpO2: 96% (27 Jun 2025 05:16) (96% - 100%)    Parameters below as of 27 Jun 2025 05:16  Patient On (Oxygen Delivery Method): room air        PHYSICAL EXAM:  GENERAL: NAD, well-developed, well-nourished  HEAD: Atraumatic, Normocephalic  EYES: EOMI, PERRLA, conjunctiva and sclera clear  NECK: Supple, No JVD  CHEST/LUNG: Clear to auscultation bilaterally; No wheezes or crackles  HEART: Normal S1/S2; Regular rate and rhythm; No murmurs, rubs, or gallops  ABDOMEN: Soft, Nontender, Nondistended; Bowel sounds present  EXTREMITIES: 2+ Peripheral Pulses; No clubbing, cyanosis, or edema  PSYCH: A&Ox3  NEUROLOGY: no focal neurologic deficit  SKIN: No rashes or lesions.     LABS:                        11.5   3.64  )-----------( 156      ( 26 Jun 2025 06:06 )             34.0      06-27    139  |  103  |  11  ----------------------------<  99  3.8   |  22  |  0.71    Ca    8.4      27 Jun 2025 06:27  Phos  3.5     06-27  Mg     1.80     06-27    TPro  7.7  /  Alb  4.1  /  TBili  0.2  /  DBili  x   /  AST  21  /  ALT  18  /  AlkPhos  41  06-27          Urinalysis Basic - ( 27 Jun 2025 06:27 )    Color: x / Appearance: x / SG: x / pH: x  Gluc: 99 mg/dL / Ketone: x  / Bili: x / Urobili: x   Blood: x / Protein: x / Nitrite: x   Leuk Esterase: x / RBC: x / WBC x   Sq Epi: x / Non Sq Epi: x / Bacteria: x        RADIOLOGY & ADDITIONAL TESTS:    Imaging Personally Reviewed:    Consultant(s) Notes Reviewed:      Care Discussed with Consultants/Other Providers:

## 2025-06-27 NOTE — DISCHARGE NOTE PROVIDER - NSDCCPCAREPLAN_GEN_ALL_CORE_FT
PRINCIPAL DISCHARGE DIAGNOSIS  Diagnosis: Fever  Assessment and Plan of Treatment: You came to the hospital because of fevers, rash, and headache.     PRINCIPAL DISCHARGE DIAGNOSIS  Diagnosis: Fever  Assessment and Plan of Treatment: You came to the hospital because of fevers, rash, and headache. We got a CT scan of your brain that did not show any bleeding. We also got a sample of your spinal fluid that has not shown signs of infection on the testing that we have gotten back so far. One test that did show up positive is Parvovirus, which you may have. This is a viral illness, usually seen among children, that improves with supportive treatment. However, given your rash and labs, we were also concerned for a tick-borne illness. Our Infectious Disease team saw you and started you on medication. Our Hematology Oncology team also followed to make sure your labs (low white blood cells) improved. We think that your lab abnormalities are due to infection and are starting to improve as the infection improves.  Please TAKE:  - Doxycycline 100mg (2 capsules) starting 6/27 until 7/1  - Please be careful in the sun while taking this medication because it could cause a rash  Please follow up with your PCP within a week to monitor your symptoms. If you experience high fevers, N&V, confusion, weakness, or numbness, please return to the ED.      SECONDARY DISCHARGE DIAGNOSES  Diagnosis: Fever  Assessment and Plan of Treatment: Viniste al hospital con fiebre, sarpullido y dolor de rell. Le hicimos randy tomografía computarizada cerebral que no mostró sangrado. También obtuvimos randy muestra de líquido cefalorraquídeo que no ha mostrado signos de infección en los análisis que hemos recibido hasta el momento. Randy prueba que mckenzie positivo es parvovirus, que podría tener. Se trata de randy enfermedad viral, generalmente infantil, que mejora con tratamiento de apoyo. Sin embargo, dado tu sarpullido y tus análisis de laboratorio, también nos preocupaba que fuera randy enfermedad transmitida por garrapatas. Nuestro equipo de Enfermedades Infecciosas te examinó y le inició tratamiento con medicación. Nuestro equipo de Oncología Hematológica también le hizo seguimiento para asegurarse de que tus análisis (nivel bajo de glóbulos blancos) mejoraran. Creemos que las anomalías en tus análisis se deben a randy infección y están empezando a mejorar a medida que esta mejora.  Por favor, TOME:  - Doxiciclina 100 mg (2 cápsulas) desde el 27/6 hasta el 1/7  - Tenga cuidado con el sol mientras ruchi tory medicamento, ya que podría causar sarpullido  Consulte con tu médico de cabecera dentro de randy semana para controlar tus síntomas. Si experimentas fiebre bhavya, náuseas y vómitos, confusión, debilidad o entumecimiento, regrese al departamento de emergencias.  A continuación encontrará el análisis completo para tu propio conocimiento. Aún quedan algunos análisis pendientes, así que consulte con tu médico de cabecera para obtener los resultados.  - Negativo: CSF PCR (Escherichia coli; Haemophilus influenzae; Listeria monocytogenes; Neisseria meningitidis; Streptococcus agalactiae; Streptococcus pneumoniae; CMV; Enterovirus; HSV-1; HSV-2; Human herpesvirus 6; Parechovirus; VZV and Cryptococcus), HIV, Treponema ab, Hepatitis Panel, EBV IgM, babesiosis, West Nile IgM negative (IgG positive), EBV IgM (IgG positive), Lyme PCR CSF, Full RVP  - Positivo: Parvovirus IgM (IgG negative), serum     PRINCIPAL DISCHARGE DIAGNOSIS  Diagnosis: Fever  Assessment and Plan of Treatment: You came to the hospital because of fevers, rash, and headache. We got a CT scan of your brain that did not show any bleeding. We also got a sample of your spinal fluid that has not shown signs of infection on the testing that we have gotten back so far. One test that did show up positive is Parvovirus, which you may have. This is a viral illness, usually seen among children, that improves with supportive treatment. However, given your rash and labs, we were also concerned for a tick-borne illness. Our Infectious Disease team saw you and started you on medication. Our Hematology Oncology team also followed to make sure your labs (low white blood cells) improved. We think that your lab abnormalities are due to infection and are starting to improve as the infection improves.  Please TAKE:  - Doxycycline 100mg (2 capsules) starting 6/27 until 7/1  - Please be careful in the sun while taking this medication because it could cause a rash  Please follow up with your PCP within a week to monitor your symptoms. If you experience high fevers, N&V, confusion, weakness, or numbness, please return to the ED.      SECONDARY DISCHARGE DIAGNOSES  Diagnosis: Fever  Assessment and Plan of Treatment: Viniste al hospital con fiebre, sarpullido y dolor de rell. Le hicimos randy tomografía computarizada cerebral que no mostró sangrado. También obtuvimos randy muestra de líquido cefalorraquídeo que no ha mostrado signos de infección en los análisis que hemos recibido hasta el momento. Randy prueba que mckenzie positivo es parvovirus, que podría tener. Se trata de randy enfermedad viral, generalmente infantil, que mejora con tratamiento de apoyo. Sin embargo, dado tu sarpullido y tus análisis de laboratorio, también nos preocupaba que fuera randy enfermedad transmitida por garrapatas. Nuestro equipo de Enfermedades Infecciosas te examinó y le inició tratamiento con medicación. Nuestro equipo de Oncología Hematológica también le hizo seguimiento para asegurarse de que tus análisis (nivel bajo de glóbulos blancos) mejoraran. Creemos que las anomalías en tus análisis se deben a randy infección y están empezando a mejorar a medida que esta mejora.  Por favor, TOME:  - Doxiciclina 100 mg (2 cápsulas) desde el 27/6 hasta el 1/7  - Tenga cuidado con el sol mientras ruchi tory medicamento, ya que podría causar sarpullido  Consulte con tu médico de cabecera dentro de randy semana para controlar tus síntomas. Si experimentas fiebre bhavya, náuseas y vómitos, confusión, debilidad o entumecimiento, regrese al departamento de emergencias.  A continuación encontrará el análisis completo para tu propio conocimiento. Aún quedan algunos análisis pendientes, así que consulte con tu médico de cabecera para obtener los resultados.  - Negativo: CSF PCR (Escherichia coli; Haemophilus influenzae; Listeria monocytogenes; Neisseria meningitidis; Streptococcus agalactiae; Streptococcus pneumoniae; CMV; Enterovirus; HSV-1; HSV-2; Human herpesvirus 6; Parechovirus; VZV and Cryptococcus), HIV, Treponema ab, Hepatitis Panel, EBV IgM, Babesia, West Nile IgM negative (IgG positive), EBV IgM (IgG positive), Lyme PCR CSF, Full Respiratory Virus Panel, Blood Cultures  - Positivo: Parvovirus IgM (IgG negative), serum

## 2025-06-27 NOTE — PROGRESS NOTE ADULT - PROBLEM SELECTOR PLAN 3
- Improved platelet count, now WNL   - F/u on infectious workup   - per Heme/Onc, if blood counts continue to worse, will re-assess need for additional workup such as bone marrow biopsy

## 2025-06-27 NOTE — DISCHARGE NOTE PROVIDER - NSDCFUADDAPPT_GEN_ALL_CORE_FT
APPTS ARE READY TO BE MADE: [X] YES    Best Family or Patient Contact (if needed):    Additional Information about above appointments (if needed):    1: PCP (Dr. Mac Cervantes) within 1 week  2:   3:     Other comments or requests:    APPTS ARE READY TO BE MADE: [X] YES    Best Family or Patient Contact (if needed):    Additional Information about above appointments (if needed):    1: PCP (Dr. Mac Cervantes) within 1 week  2:   3:     Other comments or requests:   Patient was outreached but did not answer. A voicemail was left for the patient to return our call. APPTS ARE READY TO BE MADE: [X] YES    Best Family or Patient Contact (if needed):    Additional Information about above appointments (if needed):    1: PCP (Dr. Mac Cervantes) within 1 week  2:   3:     Other comments or requests:       Appointment was scheduled in [facility] on 7/30 AT 5:15PM   Sent message to office to have appt sooner

## 2025-06-27 NOTE — PROGRESS NOTE ADULT - ATTENDING COMMENTS
34W w/ SS trait presenting with fever, headache, rash, and arthralgias since Saturday found to have leukopenia/neutropenia, thrombocytopenia, LP with CSF with elevated protein and without pleocytosis and negative meningitis PCR overall c/f tickborne illness vs viral infection, receiving empiric doxycycline and improving.    This morning has persistent headache but less severe than yesterday. Rash is nearly resolved. Afebrile  Plt normalized and WBC increased to 3.6 with ANC increased to 910 + 12% bands  HIV negative  Lyme serologies neg  EBV serologies c/w past infection  Hepatitis panel negative  Babesia PCR neg    Pending: RMSF serologies, WNV serum and CSF Abs, Lyme Ab CSF, Parvovirus serologies, Ehrlichia/anaplasma PCR serum    - ID consulted  - F/up blood and CSF cultures -- NGTD  - F/up pending studies above  - Doxycycline 100 mg PO BID  - Hematology consulted given worsening neutropenia - per review of smear feel most likely reactive due to infection  - Symptomatic management of headache      Seen with     Time-based billing (NON-critical care).   35 minutes spent on total encounter, which excludes teaching and separately reported services. The necessity of the time spent during the encounter on this date of service was due to:   Documentation in [place], reviewing chart and coordinating care with patient/resident and interdisciplinary staff (such as , social workers, etc) as well as reviewing vitals, laboratory data, radiology, medication list, consultants' recommendations and prior records. Interventions were performed as documented above.
34W w/ SS trait presenting with fever, headache, rash, and arthralgias since Saturday found to have leukopenia/neutropenia, thrombocytopenia, unremarkable LP, and positive parvovirus IgM; receiving empiric doxycycline and improving.    This morning has persistent headache but less severe than yesterday. Rash is nearly resolved. Afebrile  Plt normalized and WBC and ANC continue to increase    HIV negative  Lyme serologies neg  EBV serologies c/w past infection  Hepatitis panel negative  Babesia PCR neg  WNV CSF AB negative  **Parvovirus IgM positive**      Pending: RMSF serologies, WNV serum, Lyme Ab CSF, Ehrlichia/anaplasma PCR serum    Overall picture most likely due to parvovirus infection. Have not yet ruled out RMSF or ehrlichia/anaplasma    - ID consulted  - F/up blood and CSF cultures -- NGTD  - F/up pending studies above  - Doxycycline 100 mg PO BID x7 day course given pending studies above  - Hematology consulted given worsening neutropenia - per review of smear feel most likely reactive due to infection  - Symptomatic management of headache    Plan to d/c home today    Seen with     Time-based billing (NON-critical care).   35 minutes spent on total encounter, which excludes teaching and separately reported services. The necessity of the time spent during the encounter on this date of service was due to:   Documentation in [place], reviewing chart and coordinating care with patient/resident and interdisciplinary staff (such as , social workers, etc) as well as reviewing vitals, laboratory data, radiology, medication list, consultants' recommendations and prior records. Interventions were performed as documented above.
34W w/ SS trait presenting with fever, headache, rash, and arthralgias since Saturday found to have leukopenia/neutropenia, thrombocytopenia, LP with CSF with elevated protein and without pleocytosis and negative meningitis PCR overall c/f tickborne illness vs viral infection.    This morning continues to have severe headache. Rash is improving  Plt decreased from prior. ANC decreased to 480.  HIV negative  Lyme serologies neg    - ID consulted  - F/up blood and CSF cultures  - F/up CSF lyme PCR   - F/up, anaplasma/ehrlichia serum PCR  - F/up Rickettsial serology  - F/up EBV serology  - F/up Serum WNV serology  - Doxycycline 100 mg PO BID  - Hematology consulted given worsening neutropenia - per review of smear feel most likely reactive due to infection  - Symptomatic mangagment of headache    Seen with     Time-based billing (NON-critical care).   35 minutes spent on total encounter, which excludes teaching and separately reported services. The necessity of the time spent during the encounter on this date of service was due to:   Documentation in [place], reviewing chart and coordinating care with patient/resident and interdisciplinary staff (such as , social workers, etc) as well as reviewing vitals, laboratory data, radiology, medication list, consultants' recommendations and prior records. Interventions were performed as documented above.

## 2025-06-27 NOTE — DISCHARGE NOTE PROVIDER - HOSPITAL COURSE
For full details, please see H&P, progress notes, consult notes and ancillary notes.    H&P    Patient admitted to Internal Medicine for further management.    Hospital Course:  Patient seen by Infectious Disease and started on doxycycline for concern of possible tick-borne illness. Course complicated by severe neutropenia and Hematology Oncology was consulted. Likely thought to be 2/2 infection so pursued continued monitoring. Patient's rash and pancytopenia improved by Day 3 of admission. Parvovirus IgM positive.    On day of discharge, patient is clinically stable with no new exam findings or acute symptoms compared to prior. The patient was seen by the attending physician on the date of discharge and deemed stable and acceptable for discharge. The patient's chronic medical conditions were treated accordingly per the patient's home medication regimen. The patient's medication reconciliation (with changes made to chronic medications), follow up appointments, discharge orders, instructions, and significant lab and diagnostic studies are as noted.     Discharge follow up action items:     1. Follow up with:  - PCP    2. Follow up labs:  -     3. Medication changes:  - ADDED doxycycline 100mg q12 (until 7/1 AM, for total 7 day course)    4. On hold medications:  - None    5. Incidental findings:  - None    Patient's ordered code status: Full Code  Patient disposition: Home, no needs    Patient will be discharged to home with close follow up.    For full details, please see H&P, progress notes, consult notes and ancillary notes.    H&P  Patient is a 35yo female with PMH anemia presenting with headache, fever, and rash since Saturday. Patient reports that predominant symptom was groin and buttocks pruritis during which she noticed a rash in the affected areas. Around that time also noticed a rash on the dorsum of the feet and proximal upper extremities. During that time developed a 7/10 headache that is relieved by Motrin. It is not the worst headache of her life but is new for her. Her last headache was during pregnancy.  Denies any neck pain, stiffness, nausea, vomiting, or photophobia. No episodes of confusion. Has intermittently had a fever of 101 and 102F at home.     She denies other infectious symptoms including rhinorrhea, cough, sore throat, abdominal pain, dysuria, or diarrhea. She is tolerating PO intake. Denies any sick contacts. Has a child in school, but she has not been ill recently. She [place] a [place] on [place], but was after symptoms started. She is up to date on childhood vaccinations to her knowledge.     In the ED, patient afebrile and VSS with soft BPs 95/64 (normal per patient). Patient noted to be well-appearing without neurological deficits. Labs significant for leukopenia (1.80), mild anemia (11.3), and mild thrombocytopenia (146). With 19.3% bands. CMP unremarkable. CRP elevated at 7.4 but ESR WNL. Lactate WNL. Full RVP negative. CXR clear. LP performed that was bloody with increased protein and normal glucose. CSF PCR negative. While in the ED, patient received ceftriaxone 2g, vancomycin 1g, metoclopramide 10mg, and 1L LR. Patient admitted to medicine for further management.    Patient admitted to Internal Medicine for further management.    Hospital Course:  Patient's CSF studies not consistent with meningitis, so antibiotics and antivirals were discontinued. Differential included viral illness such as Parvovirus or HIV versus tickborne illness. Patient seen by Infectious Disease and started on doxycycline for possibility of rickettsia or other tick-borne illness. Course complicated by severe neutropenia and Hematology Oncology was consulted. Likely thought to be 2/2 infection so pursued continued monitoring. Patient's rash and pancytopenia improved by Day 3 of admission. Parvovirus IgM positive.    Infectious Workup:   - Negative: CSF PCR (Escherichia coli; Haemophilus influenzae; Listeria monocytogenes; Neisseria meningitidis; Streptococcus agalactiae; Streptococcus pneumoniae; CMV; Enterovirus; HSV-1; HSV-2; Human herpesvirus 6; Parechovirus; VZV and Cryptococcus), HIV, Treponema ab, Hepatitis Panel, EBV IgM, babesiosis, West Nile IgM negative (IgG positive), EBV IgM (IgG positive), Lyme PCR CSF, Full RVP  - Positive: Parvovirus IgM (IgG negative), serum    On day of discharge, patient is clinically stable with no new exam findings or acute symptoms compared to prior. The patient was seen by the attending physician on the date of discharge and deemed stable and acceptable for discharge. The patient's chronic medical conditions were treated accordingly per the patient's home medication regimen. The patient's medication reconciliation (with changes made to chronic medications), follow up appointments, discharge orders, instructions, and significant lab and diagnostic studies are as noted.     Discharge follow up action items:     1. Follow up with:  - PCP    2. Follow up labs:  - Ehrlichia/Anaplasma PCR  - [place] Spotted Fever IgG/IgM  - West Nile Virus IgG/IgM Antibody, Serum  - Lyme Antibodies CSF    3. Medication changes:  - ADDED doxycycline 100mg q12 (until 7/1 AM, for total 7 day course)    4. On hold medications:  - None    5. Incidental findings:  - None    Patient's ordered code status: Full Code  Patient disposition: Home, no needs    Patient will be discharged to home with close follow up.     Discharge Diagnoses:  #Parvovirus  #Concern for tick-borne illness, on treatment  #Reactive Pancytopenia, resolving For full details, please see H&P, progress notes, consult notes and ancillary notes.    H&P  Patient is a 35yo female with PMH anemia presenting with headache, fever, and rash since Saturday. Patient reports that predominant symptom was groin and buttocks pruritis during which she noticed a rash in the affected areas. Around that time also noticed a rash on the dorsum of the feet and proximal upper extremities. During that time developed a 7/10 headache that is relieved by Motrin. It is not the worst headache of her life but is new for her. Her last headache was during pregnancy.  Denies any neck pain, stiffness, nausea, vomiting, or photophobia. No episodes of confusion. Has intermittently had a fever of 101 and 102F at home.     She denies other infectious symptoms including rhinorrhea, cough, sore throat, abdominal pain, dysuria, or diarrhea. She is tolerating PO intake. Denies any sick contacts. Has a child in school, but she has not been ill recently. She [place] a lake on Sunday, but was after symptoms started. She is up to date on childhood vaccinations to her knowledge.     In the ED, patient afebrile and VSS with soft BPs 95/64 (normal per patient). Patient noted to be well-appearing without neurological deficits. Labs significant for leukopenia (1.80), mild anemia (11.3), and mild thrombocytopenia (146). With 19.3% bands. CMP unremarkable. CRP elevated at 7.4 but ESR WNL. Lactate WNL. Full RVP negative. CXR clear. LP performed that was bloody with increased protein and normal glucose. CSF PCR negative. While in the ED, patient received ceftriaxone 2g, vancomycin 1g, metoclopramide 10mg, and 1L LR. Patient admitted to medicine for further management.    Patient admitted to Internal Medicine for further management.    Hospital Course:  Patient's CSF studies not consistent with meningitis (high protein, normal glucose, but normal WBC and negative CSF PCR), so antibiotics and antivirals were discontinued. CT head negative for SAH or other acute cranial pathology. Differential included viral illness such as Parvovirus or HIV versus tickborne illness. Patient seen by Infectious Disease and started on doxycycline for possibility of rickettsia or other tick-borne illness. Course complicated by severe neutropenia and Hematology Oncology was consulted. Likely thought to be reactive 2/2 infection so pursued continued monitoring. Patient's rash and pancytopenia improved by Day 3 of admission. Parvovirus IgM positive. Patient afebrile during duration of admission.    Infectious Workup:   - Negative: CSF PCR (Escherichia coli; Haemophilus influenzae; Listeria monocytogenes; Neisseria meningitidis; Streptococcus agalactiae; Streptococcus pneumoniae; CMV; Enterovirus; HSV-1; HSV-2; Human herpesvirus 6; Parechovirus; VZV and Cryptococcus), HIV, Treponema ab, Hepatitis Panel, EBV IgM, babesiosis, West Nile IgM negative (IgG positive), EBV IgM (IgG positive), Lyme PCR CSF, Full RVP  - Positive: Parvovirus IgM (IgG negative), serum    On day of discharge, patient is clinically stable with no new exam findings or acute symptoms compared to prior. The patient was seen by the attending physician on the date of discharge and deemed stable and acceptable for discharge. The patient's chronic medical conditions were treated accordingly per the patient's home medication regimen. The patient's medication reconciliation (with changes made to chronic medications), follow up appointments, discharge orders, instructions, and significant lab and diagnostic studies are as noted.     Discharge follow up action items:     1. Follow up with:  - PCP    2. Follow up labs:  - Ehrlichia/Anaplasma PCR  - [place] Spotted Fever IgG/IgM  - West Nile Virus IgG/IgM Antibody, Serum  - Lyme Antibodies CSF    3. Medication changes:  - ADDED doxycycline 100mg q12 (until 7/1 AM, for total 7 day course)    4. On hold medications:  - None    5. Incidental findings:  - None    Patient's ordered code status: Full Code  Patient disposition: Home, no needs    Patient will be discharged to home with close follow up.     Discharge Diagnoses:  #Parvovirus  #Concern for tick-borne illness, on treatment  #Reactive Pancytopenia, resolving   For full details, please see H&P, progress notes, consult notes and ancillary notes.    H&P  Patient is a 33yo female with PMH anemia presenting with headache, fever, and rash since Saturday. Patient reports that predominant symptom was groin and buttocks pruritis during which she noticed a rash in the affected areas. Around that time also noticed a rash on the dorsum of the feet and proximal upper extremities. During that time developed a 7/10 headache that is relieved by Motrin. It is not the worst headache of her life but is new for her. Her last headache was during pregnancy.  Denies any neck pain, stiffness, nausea, vomiting, or photophobia. No episodes of confusion. Has intermittently had a fever of 101 and 102F at home.     She denies other infectious symptoms including rhinorrhea, cough, sore throat, abdominal pain, dysuria, or diarrhea. She is tolerating PO intake. Denies any sick contacts. Has a child in school, but she has not been ill recently. She [place] a lake on Sunday, but was after symptoms started. She is up to date on childhood vaccinations to her knowledge.     In the ED, patient afebrile and VSS with soft BPs 95/64 (normal per patient). Patient noted to be well-appearing without neurological deficits. Labs significant for leukopenia (1.80), mild anemia (11.3), and mild thrombocytopenia (146). With 19.3% bands. CMP unremarkable. CRP elevated at 7.4 but ESR WNL. Lactate WNL. Full RVP negative. CXR clear. LP performed that was bloody with increased protein and normal glucose. CSF PCR negative. While in the ED, patient received ceftriaxone 2g, vancomycin 1g, metoclopramide 10mg, and 1L LR. Patient admitted to medicine for further management.    Patient admitted to Internal Medicine for further management.    Hospital Course:  Patient's CSF studies not consistent with meningitis (high protein, normal glucose, but normal WBC and negative CSF PCR), so antibiotics and antivirals were discontinued. CT head negative for SAH or other acute cranial pathology. Differential included viral illness such as Parvovirus or HIV versus tickborne illness. Patient seen by Infectious Disease and started on doxycycline for possibility of rickettsia or other tick-borne illness. Course complicated by severe neutropenia and Hematology Oncology was consulted. Likely thought to be reactive 2/2 infection so pursued continued monitoring. Patient's rash and pancytopenia improved by Day 3 of admission. Parvovirus IgM positive. Patient afebrile during duration of admission.    Infectious Workup:   - Negative: CSF PCR (Escherichia coli; Haemophilus influenzae; Listeria monocytogenes; Neisseria meningitidis; Streptococcus agalactiae; Streptococcus pneumoniae; CMV; Enterovirus; HSV-1; HSV-2; Human herpesvirus 6; Parechovirus; VZV and Cryptococcus), HIV, Treponema ab, Hepatitis Panel, EBV IgM, babesia, [place] IgM negative (IgG positive), EBV IgM (IgG positive), Lyme PCR CSF, Full RVP, blood cultures  - Positive: Parvovirus IgM (IgG negative), serum    On day of discharge, patient is clinically stable with no new exam findings or acute symptoms compared to prior. The patient was seen by the attending physician on the date of discharge and deemed stable and acceptable for discharge. The patient's chronic medical conditions were treated accordingly per the patient's home medication regimen. The patient's medication reconciliation (with changes made to chronic medications), follow up appointments, discharge orders, instructions, and significant lab and diagnostic studies are as noted.     Discharge follow up action items:     1. Follow up with:  - PCP    2. Follow up labs:  - Ehrlichia/Anaplasma PCR  - [place] Spotted Fever IgG/IgM  - West Nile Virus IgG/IgM Antibody, Serum  - Lyme Antibodies CSF    3. Medication changes:  - ADDED doxycycline 100mg q12 (until 7/1 AM, for total 7 day course)    4. On hold medications:  - None    5. Incidental findings:  - None    Patient's ordered code status: Full Code  Patient disposition: Home, no needs    Patient will be discharged to home with close follow up.     Discharge Diagnoses:  #Parvovirus (IgM positive)  #Concern for tick-borne illness, on treatment  #Reactive Pancytopenia, resolving   For full details, please see H&P, progress notes, consult notes and ancillary notes.    H&P  Patient is a 33yo female with PMH anemia presenting with headache, fever, and rash since Saturday. Patient reports that predominant symptom was groin and buttocks pruritis during which she noticed a rash in the affected areas. Around that time also noticed a rash on the dorsum of the feet and proximal upper extremities. During that time developed a 7/10 headache that is relieved by Motrin. It is not the worst headache of her life but is new for her. Her last headache was during pregnancy.  Denies any neck pain, stiffness, nausea, vomiting, or photophobia. No episodes of confusion. Has intermittently had a fever of 101 and 102F at home.     She denies other infectious symptoms including rhinorrhea, cough, sore throat, abdominal pain, dysuria, or diarrhea. She is tolerating PO intake. Denies any sick contacts. Has a child in school, but she has not been ill recently. She visited a lake on Sunday, but was after symptoms started. She is up to date on childhood vaccinations to her knowledge.     In the ED, patient afebrile and VSS with soft BPs 95/64 (normal per patient). Patient noted to be well-appearing without neurological deficits. Labs significant for leukopenia (1.80), mild anemia (11.3), and mild thrombocytopenia (146). With 19.3% bands. CMP unremarkable. CRP elevated at 7.4 but ESR WNL. Lactate WNL. Full RVP negative. CXR clear. LP performed that was bloody with increased protein and normal glucose. CSF PCR negative. While in the ED, patient received ceftriaxone 2g, vancomycin 1g, metoclopramide 10mg, and 1L LR. Patient admitted to medicine for further management.    Patient admitted to Internal Medicine for further management.    Hospital Course:  Patient's CSF studies not consistent with meningitis (high protein, normal glucose, but normal WBC and negative CSF PCR), so antibiotics and antivirals were discontinued. CT head negative for SAH or other acute cranial pathology. Differential included viral illness such as Parvovirus or HIV versus tickborne illness. Patient seen by Infectious Disease and started on doxycycline for possibility of rickettsia or other tick-borne illness. Course complicated by severe neutropenia and Hematology Oncology was consulted. Likely thought to be reactive 2/2 infection so pursued continued monitoring. Patient's rash and pancytopenia improved by Day 3 of admission. Parvovirus IgM positive. Patient afebrile during duration of admission.    Infectious Workup:   - Negative: CSF PCR (Escherichia coli; Haemophilus influenzae; Listeria monocytogenes; Neisseria meningitidis; Streptococcus agalactiae; Streptococcus pneumoniae; CMV; Enterovirus; HSV-1; HSV-2; Human herpesvirus 6; Parechovirus; VZV and Cryptococcus), HIV, Treponema ab, Hepatitis Panel, EBV IgM, babesia, [place] IgM negative (IgG positive), EBV IgM (IgG positive), Lyme PCR CSF, Full RVP, blood cultures  - Positive: Parvovirus IgM (IgG negative), serum    On day of discharge, patient is clinically stable with no new exam findings or acute symptoms compared to prior. The patient was seen by the attending physician on the date of discharge and deemed stable and acceptable for discharge. The patient's chronic medical conditions were treated accordingly per the patient's home medication regimen. The patient's medication reconciliation (with changes made to chronic medications), follow up appointments, discharge orders, instructions, and significant lab and diagnostic studies are as noted.     Discharge follow up action items:     1. Follow up with:  - PCP    2. Follow up labs:  - Ehrlichia/Anaplasma PCR  - [place] Spotted Fever IgG/IgM  - West Nile Virus IgG/IgM Antibody, Serum  - Lyme Antibodies CSF    3. Medication changes:  - ADDED doxycycline 100mg q12 (6/24PM - 7/1 AM, for total 7 day course)    4. On hold medications:  - None    5. Incidental findings:  - None    Patient's ordered code status: Full Code  Patient disposition: Home, no needs    Patient will be discharged to home with close follow up.     Discharge Diagnoses:  #Parvovirus (IgM positive)  #Concern for tick-borne illness, on treatment  #Reactive Pancytopenia, resolving   For full details, please see H&P, progress notes, consult notes and ancillary notes.    H&P  Patient is a 33yo female with PMH anemia presenting with headache, fever, and rash since Saturday. Patient reports that predominant symptom was groin and buttocks pruritis during which she noticed a rash in the affected areas. Around that time also noticed a rash on the dorsum of the feet and proximal upper extremities. During that time developed a 7/10 headache that is relieved by Motrin. It is not the worst headache of her life but is new for her. Her last headache was during pregnancy.  Denies any neck pain, stiffness, nausea, vomiting, or photophobia. No episodes of confusion. Has intermittently had a fever of 101 and 102F at home.     She denies other infectious symptoms including rhinorrhea, cough, sore throat, abdominal pain, dysuria, or diarrhea. She is tolerating PO intake. Denies any sick contacts. Has a child in school, but she has not been ill recently. She [place] a lake on Sunday, but was after symptoms started. She is up to date on childhood vaccinations to her knowledge.     In the ED, patient afebrile and VSS with soft BPs 95/64 (normal per patient). Patient noted to be well-appearing without neurological deficits. Labs significant for leukopenia (1.80), mild anemia (11.3), and mild thrombocytopenia (146). With 19.3% bands. CMP unremarkable. CRP elevated at 7.4 but ESR WNL. Lactate WNL. Full RVP negative. CXR clear. LP performed that was bloody with increased protein and normal glucose. CSF PCR negative. While in the ED, patient received ceftriaxone 2g, vancomycin 1g, metoclopramide 10mg, and 1L LR. Patient admitted to medicine for further management.    Patient admitted to Internal Medicine for further management.    Hospital Course:  Patient's CSF studies not consistent with meningitis (high protein, normal glucose, but normal WBC and negative CSF PCR), so antibiotics and antivirals were discontinued. CT head negative for SAH or other acute cranial pathology. Differential included viral illness such as Parvovirus or HIV versus tickborne illness. Patient seen by Infectious Disease and started on doxycycline for possibility of rickettsia or other tick-borne illness. Course complicated by severe neutropenia and Hematology Oncology was consulted. Likely thought to be reactive 2/2 infection so pursued continued monitoring. Patient's rash and pancytopenia improved by Day 3 of admission. Parvovirus IgM positive. Patient afebrile during duration of admission. Infectious workup largely negative. Continued on empiric Doxycycline to complete a 7 day course given that RMSF and Ehrlichia/anaplasma were still pending at discharge.     Infectious Workup:   - Negative: CSF PCR (Escherichia coli; Haemophilus influenzae; Listeria monocytogenes; Neisseria meningitidis; Streptococcus agalactiae; Streptococcus pneumoniae; CMV; Enterovirus; HSV-1; HSV-2; Human herpesvirus 6; Parechovirus; VZV and Cryptococcus), HIV, Treponema ab, Hepatitis Panel, EBV IgM, babesia, [place] IgM negative (IgG positive), EBV IgM (IgG positive), Lyme PCR CSF, Full RVP, blood cultures  - Positive: Parvovirus IgM (IgG negative), serum.  -Pending RMSF serologies, WNV serum Abs, Lyme Ab CSF, Ehrlichia/anaplasma PCR serum    On day of discharge, patient is clinically stable with no new exam findings or acute symptoms compared to prior. The patient was seen by the attending physician on the date of discharge and deemed stable and acceptable for discharge. The patient's chronic medical conditions were treated accordingly per the patient's home medication regimen. The patient's medication reconciliation (with changes made to chronic medications), follow up appointments, discharge orders, instructions, and significant lab and diagnostic studies are as noted.     Discharge follow up action items:     1. Follow up with:  - PCP    2. Follow up labs:  - Ehrlichia/Anaplasma PCR  - [place] Spotted Fever IgG/IgM  - West Nile Virus IgG/IgM Antibody, Serum  - Lyme Antibodies CSF    3. Medication changes:  - ADDED doxycycline 100mg q12 (6/24PM - 7/1 AM, for total 7 day course)    4. On hold medications:  - None    5. Incidental findings:  - None    Patient's ordered code status: Full Code  Patient disposition: Home, no needs    Patient will be discharged to home with close follow up.     Discharge Diagnoses:  #Parvovirus (IgM positive)  #Concern for tick-borne illness, on treatment  #Reactive Pancytopenia, resolving   For full details, please see H&P, progress notes, consult notes and ancillary notes.    H&P  Patient is a 33yo female with PMH anemia presenting with headache, fever, and rash since Saturday. Patient reports that predominant symptom was groin and buttocks pruritis during which she noticed a rash in the affected areas. Around that time also noticed a rash on the dorsum of the feet and proximal upper extremities. During that time developed a 7/10 headache that is relieved by Motrin. It is not the worst headache of her life but is new for her. Her last headache was during pregnancy.  Denies any neck pain, stiffness, nausea, vomiting, or photophobia. No episodes of confusion. Has intermittently had a fever of 101 and 102F at home.     She denies other infectious symptoms including rhinorrhea, cough, sore throat, abdominal pain, dysuria, or diarrhea. She is tolerating PO intake. Denies any sick contacts. Has a child in school, but she has not been ill recently. She [place] on [place], but was after symptoms started. She is up to date on childhood vaccinations to her knowledge.     In the ED, patient afebrile and VSS with soft BPs 95/64 (normal per patient). Patient noted to be well-appearing without neurological deficits. Labs significant for leukopenia (1.80), mild anemia (11.3), and mild thrombocytopenia (146). With 19.3% bands. CMP unremarkable. CRP elevated at 7.4 but ESR WNL. Lactate WNL. Full RVP negative. CXR clear. LP performed that was bloody with increased protein and normal glucose. CSF PCR negative. While in the ED, patient received ceftriaxone 2g, vancomycin 1g, metoclopramide 10mg, and 1L LR. Patient admitted to medicine for further management.    Patient admitted to Internal Medicine for further management.    Hospital Course:  Patient's CSF studies not consistent with meningitis (high protein, normal glucose, but normal WBC and negative CSF PCR), so antibiotics and antivirals were discontinued. CT head negative for SAH or other acute cranial pathology. Differential included viral illness such as Parvovirus or HIV versus tickborne illness. Patient seen by Infectious Disease and started on doxycycline for possibility of rickettsia or other tick-borne illness. Course complicated by severe neutropenia and Hematology Oncology was consulted. Likely thought to be reactive 2/2 infection so pursued continued monitoring. Patient's rash and pancytopenia improved by Day 3 of admission. Parvovirus IgM positive. Patient afebrile during duration of admission. Infectious workup largely negative. Continued on empiric Doxycycline to complete a 7 day course given that RMSF and Ehrlichia/anaplasma were still pending at discharge.     Infectious Workup:   - Negative: CSF PCR (Escherichia coli; Haemophilus influenzae; Listeria monocytogenes; Neisseria meningitidis; Streptococcus agalactiae; Streptococcus pneumoniae; CMV; Enterovirus; HSV-1; HSV-2; Human herpesvirus 6; Parechovirus; VZV and Cryptococcus), HIV, Treponema ab, Hepatitis Panel, EBV IgM, babesia, West Nile IgM negative (IgG positive), EBV IgM (IgG positive), Lyme PCR CSF, Full RVP, blood cultures, WNV IgM (IgG positive)  - Positive: Parvovirus IgM (IgG negative), serum.  -Pending RMSF serologies, Lyme Ab CSF, Ehrlichia/anaplasma PCR serum    On day of discharge, patient is clinically stable with no new exam findings or acute symptoms compared to prior. The patient was seen by the attending physician on the date of discharge and deemed stable and acceptable for discharge. The patient's chronic medical conditions were treated accordingly per the patient's home medication regimen. The patient's medication reconciliation (with changes made to chronic medications), follow up appointments, discharge orders, instructions, and significant lab and diagnostic studies are as noted.     Discharge follow up action items:     1. Follow up with:  - PCP    2. Follow up labs:  - Ehrlichia/Anaplasma PCR  - [place] Spotted Fever IgG/IgM  - West Nile Virus IgG/IgM Antibody, Serum  - Lyme Antibodies CSF    3. Medication changes:  - ADDED doxycycline 100mg q12 (6/24PM - 7/1 AM, for total 7 day course)    4. On hold medications:  - None    5. Incidental findings:  - None    Patient's ordered code status: Full Code  Patient disposition: Home, no needs    Patient will be discharged to home with close follow up.     Discharge Diagnoses:  #Parvovirus (IgM positive)  #Concern for tick-borne illness, on treatment  #Reactive Pancytopenia, resolving

## 2025-06-27 NOTE — PROGRESS NOTE ADULT - REASON FOR ADMISSION
Fever, Headache, Rash

## 2025-06-27 NOTE — DISCHARGE NOTE PROVIDER - NSDCCPTREATMENT_GEN_ALL_CORE_FT
PRINCIPAL PROCEDURE  Procedure: CT head wo con  Findings and Treatment: 6/24/2025  FINDINGS:  No acute intracranial hemorrhage. There is no compelling evidence for an acute transcortical infarction. There is no evidence of mass, mass   effect, midline shift or extra-axial fluid collection. The lateral   ventricles and cortical sulci are age-appropriate in size and   configuration. The orbits, mastoid air cells and visualized paranasal   sinuses are unremarkable. The calvarium is intact. Consider MRI as   clinically warranted.  IMPRESSION: No evidence of an acute transcortical infarction or   hemorrhage.      SECONDARY PROCEDURE  Procedure: CXR PA & lat  Findings and Treatment: 6/24/2025  FINDINGS:  The heart size is normal.  The lungs are clear.  There is no pneumothorax or pleural effusion.  IMPRESSION:  Clear lungs.

## 2025-06-27 NOTE — DISCHARGE NOTE PROVIDER - NSDCFUSCHEDAPPT_GEN_ALL_CORE_FT
Mac Cervantes  [facility]  [facility] [address]  Scheduled Appointment: 07/30/2025

## 2025-06-27 NOTE — DISCHARGE NOTE PROVIDER - CARE PROVIDER_API CALL
Mac Cervantes  Internal Medicine  [address] [address] [place] [id number]  Phone: [phone]  Fax: [phone]  Established Patient  Follow Up Time: 1 week

## 2025-06-27 NOTE — CHART NOTE - NSCHARTNOTEFT_GEN_A_CORE
R34F with anemia and sickle cell trait presenting with petechial rash, fever, and headache, found to have new pancytopenia. Hematology consulted for pancytopenia.    #Leukopenia  #Thrombocytopenia  - Admission CBC (6/24/25): WBC=1.80, CHD=0585, hgb=11.3, ccc=303. Baseline wbc, hgb and plt are all within normal limits on 5/15/2025)  - Appreciate ID recs, infectious workup underway, empirically started Doxycycline 100mg PO q12h   - s/p LP (6/24/25): bloody, otherwise CSF studies WNL  - peripheral blood smear reviewed (6/25/25): RBCs normocytic and normochromic, no anisocytosis or poikilocytosis. WBCs few in number, demonstrate normal maturation, no dysplastic appearing WBCs seen. Platelets are mildly decreased in number, few large platelets and without clumping  - Based on clinical presentation and acuity of pancytopenia, suspect pancytopenia is in the setting of recent/ongoing infectious etiology vs. autoimmune condition given rash vs. less likely primary bone marrow disorder. Positive infectious testing so far has included EBV, West Nile IgG (CSF), and parvovirus IgM.  - Neutropenia resolved, substantiating reaction to infectious process    Recommend:  - Agree with infectious workup and treatment per primary team and ID  - No indication to give Zarxio at this time.   - Hematology will sign off. Please reconsult with any additional questions.    - Discussed with Dr. Charles Rodarte MD  PGY-4, Hematology-Oncology  Pager:[phone]  Available on Teams

## 2025-06-27 NOTE — DISCHARGE NOTE NURSING/CASE MANAGEMENT/SOCIAL WORK - NSDCFUADDAPPT_GEN_ALL_CORE_FT
APPTS ARE READY TO BE MADE: [X] YES    Best Family or Patient Contact (if needed):    Additional Information about above appointments (if needed):    1: PCP (Dr. Mac Cervantes) within 1 week  2:   3:     Other comments or requests:       Appointment was scheduled in [place] [facility] on 7/30 AT 5:15PM   Sent message to office to have appt sooner

## 2025-06-27 NOTE — PROGRESS NOTE ADULT - ASSESSMENT
34 year old presented with body aches with rash. Associated subjective fever  Recent water exposure to [place] on Sunday  Of note , pt was febrile on Saturday prior to lake exposure    Skin eruption that is pruritic.  She notes associated headache and subjective fever    Has leukopenia with elevated eosinophilia % (but not absolute #)    WBC improved- repeat with pmd next week.     ? viral syndrome  Parvovirus IgM is positive  Can see skin rash and  cytopenia with parvovirus--- petechial rash on hands/ feet noted with parvovirus  HIV negative  RVP negative  Postive west nile IgG in CSF is likely not relevant to this presentation    Ricketsial Serology/ Anaplasma tiesting still pending  Continue doxycyline 100 mg po q 12 for now  If stable for dc --> I would finish a 7 day course of doxycyline because to cover empirically.    Juancarlos Pickard MD  Please contact via TEAM between 8 am and 6 pm    In the evening or on weekends, can contact call service at [phone]

## 2025-06-27 NOTE — PROGRESS NOTE ADULT - PROBLEM SELECTOR PLAN 2
- improved WBC (4.70) and ANC (1.78), likely due to resolving Parvovirus infection  - Heme/Onc consulted, appreciate recs   - F/u on infectious workup

## 2025-06-27 NOTE — DISCHARGE NOTE NURSING/CASE MANAGEMENT/SOCIAL WORK - NSDCVIVACCINE_GEN_ALL_CORE_FT
Tdap; 10-May-2022 02:56; Ashley Valdez (RN); [employer]; [id number] (Exp. Date: 02-Feb-2024); IntraMuscular; Deltoid Left.; 0.5 milliLiter(s); VIS (VIS Published: 09-May-2013, VIS Presented: 10-May-2022);

## 2025-06-27 NOTE — DISCHARGE NOTE NURSING/CASE MANAGEMENT/SOCIAL WORK - FINANCIAL ASSISTANCE
[facility] provides services at a reduced cost to those who are determined to be eligible through [facility]’s financial assistance program. Information regarding [facility]’s financial assistance program can be found by going to [email or web address] or by calling [phone].

## 2025-06-27 NOTE — PROGRESS NOTE ADULT - ASSESSMENT
Patient is a 35yo female with anemia and sickle cell trait presenting with petechial rash, fever, and headache. Although LP is bloody, history and CT less concerning for SAH. Petechial rash may be concerning for meningococcal septicemia, [place] mountain spotted fever, or disseminated gonococcal infection, but patient overall well-appearing. However, CSF not consistent with meningitis. Clinical picture more concerning for other infectious etiology vs. autoimmune etiology. Now with positive Parvovirus IgM. Clinical picture and pancytopenia improving. Patient medically stable for discharge today.

## 2025-06-28 LAB
A PHAGOCYTOPH DNA BLD QL NAA+PROBE: NEGATIVE — SIGNIFICANT CHANGE UP
E CHAFFEENSIS DNA BLD QL NAA+PROBE: NEGATIVE — SIGNIFICANT CHANGE UP
E EWINGII DNA SPEC QL NAA+PROBE: NEGATIVE — SIGNIFICANT CHANGE UP
EHRLICHIA DNA SPEC QL NAA+PROBE: NEGATIVE — SIGNIFICANT CHANGE UP

## 2025-06-29 LAB
CULTURE RESULTS: SIGNIFICANT CHANGE UP
R RICKETTSI AB SER-ACNC: SIGNIFICANT CHANGE UP
R RICKETTSI IGM SER-ACNC: SIGNIFICANT CHANGE UP
RICK SF IGG TITR SER IF: SIGNIFICANT CHANGE UP
RICK SF IGM TITR SER IF: SIGNIFICANT CHANGE UP
ROCKY MT SPOTTED FEVER COMMENT: SIGNIFICANT CHANGE UP
SPECIMEN SOURCE: SIGNIFICANT CHANGE UP

## 2025-07-01 LAB
LYME ADDITIONAL INFORMATION: SIGNIFICANT CHANGE UP
LYME IGG LINE BLOT INTERP.: NEGATIVE — SIGNIFICANT CHANGE UP
LYME IGM LINE BLOT INTERP.: NEGATIVE — SIGNIFICANT CHANGE UP
P18 AB. IGG: SIGNIFICANT CHANGE UP
P23 AB. IGG: SIGNIFICANT CHANGE UP
P23 AB. IGM: SIGNIFICANT CHANGE UP
P28 AB. IGG: SIGNIFICANT CHANGE UP
P30 AB. IGG: SIGNIFICANT CHANGE UP
P39 AB. IGG: SIGNIFICANT CHANGE UP
P39 AB. IGM: SIGNIFICANT CHANGE UP
P41 AB. IGG: SIGNIFICANT CHANGE UP
P41 AB. IGM: SIGNIFICANT CHANGE UP
P45 AB. IGG: SIGNIFICANT CHANGE UP
P58 AB. IGG: SIGNIFICANT CHANGE UP
P66 AB. IGG: SIGNIFICANT CHANGE UP
P93 AB. IGG: SIGNIFICANT CHANGE UP

## 2025-07-10 ENCOUNTER — APPOINTMENT (OUTPATIENT)
Dept: INTERNAL MEDICINE | Facility: CLINIC | Age: 35
End: 2025-07-10
Payer: COMMERCIAL

## 2025-07-10 VITALS
HEIGHT: 66 IN | OXYGEN SATURATION: 99 % | SYSTOLIC BLOOD PRESSURE: 97 MMHG | RESPIRATION RATE: 16 BRPM | TEMPERATURE: 98.2 F | BODY MASS INDEX: 19.77 KG/M2 | HEART RATE: 82 BPM | DIASTOLIC BLOOD PRESSURE: 63 MMHG | WEIGHT: 123 LBS

## 2025-07-10 PROBLEM — B34.3 PARVOVIRUS INFECTION: Status: ACTIVE | Noted: 2025-07-10

## 2025-07-10 PROBLEM — I95.0 IDIOPATHIC HYPOTENSION: Status: ACTIVE | Noted: 2025-07-10

## 2025-07-10 PROCEDURE — 99215 OFFICE O/P EST HI 40 MIN: CPT

## 2025-07-11 LAB
ALBUMIN SERPL ELPH-MCNC: 4.5 G/DL
ALP BLD-CCNC: 42 U/L
ALT SERPL-CCNC: 37 U/L
ANION GAP SERPL CALC-SCNC: 11 MMOL/L
AST SERPL-CCNC: 31 U/L
BILIRUB SERPL-MCNC: 0.2 MG/DL
BUN SERPL-MCNC: 15 MG/DL
CALCIUM SERPL-MCNC: 9.3 MG/DL
CHLORIDE SERPL-SCNC: 102 MMOL/L
CO2 SERPL-SCNC: 26 MMOL/L
CREAT SERPL-MCNC: 0.74 MG/DL
EGFRCR SERPLBLD CKD-EPI 2021: 109 ML/MIN/1.73M2
GLUCOSE SERPL-MCNC: 82 MG/DL
HCT VFR BLD CALC: 32.3 %
HGB BLD-MCNC: 10.2 G/DL
MCHC RBC-ENTMCNC: 27.5 PG
MCHC RBC-ENTMCNC: 31.6 G/DL
MCV RBC AUTO: 87.1 FL
PLATELET # BLD AUTO: 302 K/UL
POTASSIUM SERPL-SCNC: 4.4 MMOL/L
PROT SERPL-MCNC: 7.1 G/DL
RBC # BLD: 3.71 M/UL
RBC # FLD: 13.4 %
SODIUM SERPL-SCNC: 139 MMOL/L
WBC # FLD AUTO: 6.5 K/UL

## 2025-07-17 ENCOUNTER — APPOINTMENT (OUTPATIENT)
Dept: CARDIOLOGY | Facility: CLINIC | Age: 35
End: 2025-07-17

## 2025-07-30 ENCOUNTER — APPOINTMENT (OUTPATIENT)
Age: 35
End: 2025-07-30

## 2025-08-21 ENCOUNTER — APPOINTMENT (OUTPATIENT)
Dept: INTERNAL MEDICINE | Facility: CLINIC | Age: 35
End: 2025-08-21